# Patient Record
Sex: FEMALE | Race: WHITE | NOT HISPANIC OR LATINO | URBAN - METROPOLITAN AREA
[De-identification: names, ages, dates, MRNs, and addresses within clinical notes are randomized per-mention and may not be internally consistent; named-entity substitution may affect disease eponyms.]

---

## 2018-03-18 ENCOUNTER — EMERGENCY (EMERGENCY)
Facility: HOSPITAL | Age: 68
LOS: 0 days | Discharge: HOME | End: 2018-03-19
Attending: EMERGENCY MEDICINE

## 2018-03-18 VITALS
HEART RATE: 77 BPM | RESPIRATION RATE: 18 BRPM | DIASTOLIC BLOOD PRESSURE: 89 MMHG | OXYGEN SATURATION: 98 % | TEMPERATURE: 97 F | SYSTOLIC BLOOD PRESSURE: 185 MMHG

## 2018-03-18 DIAGNOSIS — Z98.890 OTHER SPECIFIED POSTPROCEDURAL STATES: Chronic | ICD-10-CM

## 2018-03-18 DIAGNOSIS — R07.89 OTHER CHEST PAIN: ICD-10-CM

## 2018-03-18 DIAGNOSIS — Z79.899 OTHER LONG TERM (CURRENT) DRUG THERAPY: ICD-10-CM

## 2018-03-18 DIAGNOSIS — E78.00 PURE HYPERCHOLESTEROLEMIA, UNSPECIFIED: ICD-10-CM

## 2018-03-18 DIAGNOSIS — Z79.891 LONG TERM (CURRENT) USE OF OPIATE ANALGESIC: ICD-10-CM

## 2018-03-18 DIAGNOSIS — Z90.11 ACQUIRED ABSENCE OF RIGHT BREAST AND NIPPLE: ICD-10-CM

## 2018-03-18 DIAGNOSIS — Z87.891 PERSONAL HISTORY OF NICOTINE DEPENDENCE: ICD-10-CM

## 2018-03-18 LAB
ALBUMIN SERPL ELPH-MCNC: 4.3 G/DL — SIGNIFICANT CHANGE UP (ref 3.5–5.2)
ALP SERPL-CCNC: 80 U/L — SIGNIFICANT CHANGE UP (ref 30–115)
ALT FLD-CCNC: 16 U/L — SIGNIFICANT CHANGE UP (ref 0–41)
AST SERPL-CCNC: 19 U/L — SIGNIFICANT CHANGE UP (ref 0–41)
BASOPHILS # BLD AUTO: 0.04 K/UL — SIGNIFICANT CHANGE UP (ref 0–0.2)
BASOPHILS NFR BLD AUTO: 0.5 % — SIGNIFICANT CHANGE UP (ref 0–1)
BILIRUB SERPL-MCNC: 0.2 MG/DL — SIGNIFICANT CHANGE UP (ref 0.2–1.2)
BUN SERPL-MCNC: 13 MG/DL — SIGNIFICANT CHANGE UP (ref 10–20)
CALCIUM SERPL-MCNC: 8.9 MG/DL — SIGNIFICANT CHANGE UP (ref 8.5–10.1)
CHLORIDE SERPL-SCNC: 100 MMOL/L — SIGNIFICANT CHANGE UP (ref 98–110)
CK MB BLD-MCNC: 2 % — SIGNIFICANT CHANGE UP (ref 0–4)
CK MB CFR SERPL CALC: 1.1 NG/ML — SIGNIFICANT CHANGE UP (ref 0.6–6.3)
CK SERPL-CCNC: 65 U/L — SIGNIFICANT CHANGE UP (ref 0–225)
CO2 SERPL-SCNC: 29 MMOL/L — SIGNIFICANT CHANGE UP (ref 17–32)
CREAT SERPL-MCNC: 0.8 MG/DL — SIGNIFICANT CHANGE UP (ref 0.7–1.5)
EOSINOPHIL # BLD AUTO: 0.09 K/UL — SIGNIFICANT CHANGE UP (ref 0–0.7)
EOSINOPHIL NFR BLD AUTO: 1.1 % — SIGNIFICANT CHANGE UP (ref 0–8)
GLUCOSE SERPL-MCNC: 91 MG/DL — SIGNIFICANT CHANGE UP (ref 70–110)
HCT VFR BLD CALC: 41.1 % — SIGNIFICANT CHANGE UP (ref 37–47)
HGB BLD-MCNC: 13.7 G/DL — SIGNIFICANT CHANGE UP (ref 12–16)
IMM GRANULOCYTES NFR BLD AUTO: 0.3 % — SIGNIFICANT CHANGE UP (ref 0.1–0.3)
LYMPHOCYTES # BLD AUTO: 3.48 K/UL — HIGH (ref 1.2–3.4)
LYMPHOCYTES # BLD AUTO: 44.2 % — SIGNIFICANT CHANGE UP (ref 20.5–51.1)
MCHC RBC-ENTMCNC: 29.8 PG — SIGNIFICANT CHANGE UP (ref 27–31)
MCHC RBC-ENTMCNC: 33.3 G/DL — SIGNIFICANT CHANGE UP (ref 32–37)
MCV RBC AUTO: 89.5 FL — SIGNIFICANT CHANGE UP (ref 81–99)
MONOCYTES # BLD AUTO: 0.55 K/UL — SIGNIFICANT CHANGE UP (ref 0.1–0.6)
MONOCYTES NFR BLD AUTO: 7 % — SIGNIFICANT CHANGE UP (ref 1.7–9.3)
NEUTROPHILS # BLD AUTO: 3.7 K/UL — SIGNIFICANT CHANGE UP (ref 1.4–6.5)
NEUTROPHILS NFR BLD AUTO: 46.9 % — SIGNIFICANT CHANGE UP (ref 42.2–75.2)
NRBC # BLD: 0 /100 WBCS — SIGNIFICANT CHANGE UP (ref 0–0)
PLATELET # BLD AUTO: 207 K/UL — SIGNIFICANT CHANGE UP (ref 130–400)
POTASSIUM SERPL-MCNC: 4.3 MMOL/L — SIGNIFICANT CHANGE UP (ref 3.5–5)
POTASSIUM SERPL-SCNC: 4.3 MMOL/L — SIGNIFICANT CHANGE UP (ref 3.5–5)
PROT SERPL-MCNC: 6.5 G/DL — SIGNIFICANT CHANGE UP (ref 6–8)
RBC # BLD: 4.59 M/UL — SIGNIFICANT CHANGE UP (ref 4.2–5.4)
RBC # FLD: 12.4 % — SIGNIFICANT CHANGE UP (ref 11.5–14.5)
SODIUM SERPL-SCNC: 140 MMOL/L — SIGNIFICANT CHANGE UP (ref 135–146)
TROPONIN T SERPL-MCNC: <0.01 NG/ML — SIGNIFICANT CHANGE UP
WBC # BLD: 7.88 K/UL — SIGNIFICANT CHANGE UP (ref 4.8–10.8)
WBC # FLD AUTO: 7.88 K/UL — SIGNIFICANT CHANGE UP (ref 4.8–10.8)

## 2018-03-18 RX ORDER — SODIUM CHLORIDE 9 MG/ML
3 INJECTION INTRAMUSCULAR; INTRAVENOUS; SUBCUTANEOUS ONCE
Qty: 0 | Refills: 0 | Status: COMPLETED | OUTPATIENT
Start: 2018-03-18 | End: 2018-03-18

## 2018-03-18 RX ADMIN — SODIUM CHLORIDE 3 MILLILITER(S): 9 INJECTION INTRAMUSCULAR; INTRAVENOUS; SUBCUTANEOUS at 18:35

## 2018-03-18 NOTE — ED PROVIDER NOTE - ATTENDING CONTRIBUTION TO CARE
I personally evaluated the patient. I reviewed the Resident’s or Physician Assistant’s note (as assigned above), and agree with the findings and plan except as documented in my note.     67 female here for eval of chest pressure. Recently moved from percocet to methadone by her PMD, read that methadone can cause chest pressure. Prior cardiac workup includes stress test in 2012, nothing since. Pressure is intermittent, unprovoked, not related to anything. No anginal equivalents.     PE: female in no distress. HEENT: non icteric. conjunctiva pink. CHEST: CTA bilateral. normal work of breathing. CV: pulses intact S1S2 ABD: soft, non rigid, no guarding. SKIN: normal    Impression: ACS    Plan: IV labs imaging aspirin supportive care and reevaluation

## 2018-03-18 NOTE — ED PROVIDER NOTE - NS ED ROS FT
Review of Systems    Constitutional: (-) fever/ chills (-) weight loss  Eyes/ENT: (-) blurry vision, (-) epistaxis (-) sore throat (-) ear pain  Cardiovascular: (+) chest pain, (-) syncope (-) palpitations  Respiratory: (-) cough, (-) shortness of breath  Gastrointestinal: (-) vomiting, (-) diarrhea (-) abdominal pain  Musculoskeletal: (-) neck pain, (-) back pain, (-) joint pain (-) pedal edema   Integumentary: (-) rash, (-) swelling  Neurological: (-) headache, (-) altered mental status  Psychiatric: (-) hallucinations or depression   Allergic/Immunologic: (-) pruritus

## 2018-03-18 NOTE — ED PROVIDER NOTE - MEDICAL DECISION MAKING DETAILS
67 female here for chest pressure. Had workup in ED with labs monitoring and reevaluation. Remained asymptomatic during stay. Will continue outpatient management.

## 2018-03-18 NOTE — ED PROVIDER NOTE - PMH
Anxiety    Heart murmur    High blood cholesterol    Mitral valve regurgitation Anxiety    Heart murmur    High blood cholesterol    Mitral valve regurgitation    Parotid gland adenocarcinoma    Sarcoma of bone of foot, right

## 2018-03-18 NOTE — ED PROVIDER NOTE - OBJECTIVE STATEMENT
Pt with a h/o RSD s/p soft tissue CA and anxiety comes in c/o CP. Pt states that she has been feeling intermitting pressure in her chest since Wednesday. Pt has been on Oxycodone 60mg for the last 5 years and was just recently switched over to 5mg of Methadone and last week started on 10mg of Methadone. Pt states that her symptoms with the RSD feels better but is concerned for the chest pressure. Pt denies any SOB, nausea, vomiting, back pain, leg pain.

## 2018-03-19 VITALS
RESPIRATION RATE: 18 BRPM | OXYGEN SATURATION: 98 % | HEART RATE: 71 BPM | DIASTOLIC BLOOD PRESSURE: 70 MMHG | SYSTOLIC BLOOD PRESSURE: 143 MMHG | TEMPERATURE: 97 F

## 2018-03-19 LAB
CK MB CFR SERPL CALC: 1 NG/ML — SIGNIFICANT CHANGE UP (ref 0.6–6.3)
TROPONIN T SERPL-MCNC: <0.01 NG/ML — SIGNIFICANT CHANGE UP

## 2018-12-26 ENCOUNTER — EMERGENCY (EMERGENCY)
Facility: HOSPITAL | Age: 68
LOS: 0 days | Discharge: HOME | End: 2018-12-26
Attending: EMERGENCY MEDICINE | Admitting: EMERGENCY MEDICINE

## 2018-12-26 VITALS
RESPIRATION RATE: 18 BRPM | DIASTOLIC BLOOD PRESSURE: 74 MMHG | SYSTOLIC BLOOD PRESSURE: 131 MMHG | HEART RATE: 73 BPM | OXYGEN SATURATION: 98 % | TEMPERATURE: 98 F

## 2018-12-26 VITALS
HEART RATE: 81 BPM | RESPIRATION RATE: 18 BRPM | HEIGHT: 62 IN | DIASTOLIC BLOOD PRESSURE: 98 MMHG | SYSTOLIC BLOOD PRESSURE: 145 MMHG | WEIGHT: 154.1 LBS | TEMPERATURE: 97 F
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
## 2018-12-26 DIAGNOSIS — Z91.041 RADIOGRAPHIC DYE ALLERGY STATUS: ICD-10-CM

## 2018-12-26 DIAGNOSIS — Z79.899 OTHER LONG TERM (CURRENT) DRUG THERAPY: ICD-10-CM

## 2018-12-26 DIAGNOSIS — Z98.890 OTHER SPECIFIED POSTPROCEDURAL STATES: Chronic | ICD-10-CM

## 2018-12-26 DIAGNOSIS — Z90.11 ACQUIRED ABSENCE OF RIGHT BREAST AND NIPPLE: ICD-10-CM

## 2018-12-26 DIAGNOSIS — R51 HEADACHE: ICD-10-CM
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
## 2018-12-26 DIAGNOSIS — S09.90XA UNSPECIFIED INJURY OF HEAD, INITIAL ENCOUNTER: ICD-10-CM

## 2018-12-26 DIAGNOSIS — E78.00 PURE HYPERCHOLESTEROLEMIA, UNSPECIFIED: ICD-10-CM
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
## 2018-12-26 DIAGNOSIS — Z79.891 LONG TERM (CURRENT) USE OF OPIATE ANALGESIC: ICD-10-CM
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
## 2018-12-26 PROBLEM — I34.0 NONRHEUMATIC MITRAL (VALVE) INSUFFICIENCY: Chronic | Status: ACTIVE | Noted: 2018-03-18

## 2018-12-26 PROBLEM — C07 MALIGNANT NEOPLASM OF PAROTID GLAND: Chronic | Status: ACTIVE | Noted: 2018-03-18

## 2018-12-26 PROBLEM — F41.9 ANXIETY DISORDER, UNSPECIFIED: Chronic | Status: ACTIVE | Noted: 2018-03-18

## 2018-12-26 PROBLEM — C40.31: Chronic | Status: ACTIVE | Noted: 2018-03-18

## 2018-12-26 NOTE — ED ADULT NURSE NOTE - PMH
Anxiety    Heart murmur    High blood cholesterol    Mitral valve regurgitation    Parotid gland adenocarcinoma    Sarcoma of bone of foot, right

## 2018-12-26 NOTE — ED PROVIDER NOTE - ATTENDING CONTRIBUTION TO CARE
68 year old female, sent in by psychiatrist to rule out a SDH after she fell out of her bed 2 days ago and hit her head on the floor, no loc, no n/v/d, no cp/sob, no change in vision    CONSTITUTIONAL: Well-developed; well-nourished; in no acute distress. Sitting up and providing appropriate history and physical examination  TRAUMA: Primary and Secondary surveys intact, GCS 15, no midline CTLS spine tenderness, Pelvis stable, + moving all extremities  SKIN: skin exam is warm and dry, no acute rash.  HEAD: Normocephalic; + left temporal region tenderness  EYES: PERRL, 3 mm bilateral, no nystagmus, EOM intact; conjunctiva and sclera clear.  ENT: No nasal discharge; airway clear.  NECK: Supple; non tender. + full passive ROM in all directions. No JVD  CARD: S1, S2 normal; no murmurs, gallops, or rubs. Regular rate and rhythm. + Symmetric Strong Pulses  RESP: No wheezes, rales or rhonchi. Good air movement bilaterally  ABD: soft; non-distended; non-tender. No Rebound, No Guarding, No signs of peritonitis, No CVA tenderness. No pulsatile abdominal mass. + Strong and Symmetric Pulses  EXT: Normal ROM. No clubbing, cyanosis or edema. Dp and Pt Pulses intact. Cap refill less than 3 seconds  NEURO: CN 2-12 intact, normal finger to nose, normal romberg, stable gait, no sensory or motor deficits, Alert, oriented, grossly unremarkable. No Focal deficits. GCS 15. NIH 0      I have fully discussed the medical management and delivery of care with the patient. I have discussed any available labs, imaging and treatment options with the patient. Patient confirms understanding and has been given detailed return precautions. Patient instructed to return to the ED should symptoms persist or worsen. Patient has demonstrated capacity and has verbalized understanding. Patient is well appearing upon discharge.

## 2018-12-26 NOTE — ED PROVIDER NOTE - NS ED ROS FT
Review of Systems:  	•	CONSTITUTIONAL - no fever, no diaphoresis, no chills  	•	SKIN - no rash  	•	HEMATOLOGIC - no bleeding, no bruising  	•	EYES - no eye pain, no blurry vision  	•	ENT - no change in hearing, no sore throat, no ear pain or tinnitus  	•	RESPIRATORY - no shortness of breath, no cough  	•	CARDIAC - no chest pain, no palpitations  	•  	•	PSYCH - no anxiety, non suicidal, non homicidal, no hallucination, no depression

## 2018-12-26 NOTE — ED PROVIDER NOTE - PHYSICAL EXAMINATION
--EXAM--  VITAL SIGNS: I have reviewed vs documented at present.  CONSTITUTIONAL: Well-developed; well-nourished; in no acute distress.   SKIN: Warm and dry, no acute rash.   HEAD: Normocephalic; atraumatic.    CARD: S1, S2, Regular rate and rhythm.   RESP: No wheezes, rales or rhonchi.    EXT: Normal ROM.   NEURO: Alert, oriented, grossly unremarkable. Strength 5/5 in all extremities. Sensation intact throughout.  PSYCH: Cooperative, appropriate.

## 2018-12-26 NOTE — ED PROVIDER NOTE - NS ED ATTENDING STATEMENT MOD

## 2018-12-26 NOTE — ED ADULT NURSE NOTE - CHPI ED NUR SYMPTOMS NEG
no vomiting/no weakness/no fever/no tingling/no loss of consciousness/no deformity/no confusion/no abrasion/no bleeding/no numbness

## 2020-04-16 ENCOUNTER — TRANSCRIPTION ENCOUNTER (OUTPATIENT)
Age: 70
End: 2020-04-16

## 2020-05-29 ENCOUNTER — EMERGENCY (EMERGENCY)
Facility: HOSPITAL | Age: 70
LOS: 0 days | Discharge: HOME | End: 2020-05-29
Attending: EMERGENCY MEDICINE | Admitting: EMERGENCY MEDICINE
Payer: MEDICARE

## 2020-05-29 VITALS
RESPIRATION RATE: 16 BRPM | SYSTOLIC BLOOD PRESSURE: 120 MMHG | OXYGEN SATURATION: 100 % | DIASTOLIC BLOOD PRESSURE: 69 MMHG | HEART RATE: 77 BPM

## 2020-05-29 VITALS
RESPIRATION RATE: 19 BRPM | OXYGEN SATURATION: 97 % | SYSTOLIC BLOOD PRESSURE: 142 MMHG | HEART RATE: 100 BPM | TEMPERATURE: 98 F | DIASTOLIC BLOOD PRESSURE: 101 MMHG

## 2020-05-29 DIAGNOSIS — B34.9 VIRAL INFECTION, UNSPECIFIED: ICD-10-CM

## 2020-05-29 DIAGNOSIS — R07.9 CHEST PAIN, UNSPECIFIED: ICD-10-CM

## 2020-05-29 DIAGNOSIS — R50.9 FEVER, UNSPECIFIED: ICD-10-CM

## 2020-05-29 DIAGNOSIS — Z98.890 OTHER SPECIFIED POSTPROCEDURAL STATES: Chronic | ICD-10-CM

## 2020-05-29 DIAGNOSIS — Z88.5 ALLERGY STATUS TO NARCOTIC AGENT: ICD-10-CM

## 2020-05-29 DIAGNOSIS — E78.00 PURE HYPERCHOLESTEROLEMIA, UNSPECIFIED: ICD-10-CM

## 2020-05-29 LAB
ALBUMIN SERPL ELPH-MCNC: 3.9 G/DL — SIGNIFICANT CHANGE UP (ref 3.5–5.2)
ALP SERPL-CCNC: 59 U/L — SIGNIFICANT CHANGE UP (ref 30–115)
ALT FLD-CCNC: <5 U/L — SIGNIFICANT CHANGE UP (ref 0–41)
ANION GAP SERPL CALC-SCNC: 11 MMOL/L — SIGNIFICANT CHANGE UP (ref 7–14)
APPEARANCE UR: CLEAR — SIGNIFICANT CHANGE UP
AST SERPL-CCNC: 77 U/L — HIGH (ref 0–41)
BILIRUB SERPL-MCNC: <0.2 MG/DL — SIGNIFICANT CHANGE UP (ref 0.2–1.2)
BILIRUB UR-MCNC: NEGATIVE — SIGNIFICANT CHANGE UP
BUN SERPL-MCNC: 12 MG/DL — SIGNIFICANT CHANGE UP (ref 10–20)
CALCIUM SERPL-MCNC: 8.5 MG/DL — SIGNIFICANT CHANGE UP (ref 8.5–10.1)
CHLORIDE SERPL-SCNC: 104 MMOL/L — SIGNIFICANT CHANGE UP (ref 98–110)
CO2 SERPL-SCNC: 20 MMOL/L — SIGNIFICANT CHANGE UP (ref 17–32)
COLOR SPEC: YELLOW — SIGNIFICANT CHANGE UP
CREAT SERPL-MCNC: 1 MG/DL — SIGNIFICANT CHANGE UP (ref 0.7–1.5)
DIFF PNL FLD: NEGATIVE — SIGNIFICANT CHANGE UP
GLUCOSE SERPL-MCNC: 103 MG/DL — HIGH (ref 70–99)
GLUCOSE UR QL: NEGATIVE — SIGNIFICANT CHANGE UP
HCT VFR BLD CALC: 42.5 % — SIGNIFICANT CHANGE UP (ref 37–47)
HGB BLD-MCNC: 14.3 G/DL — SIGNIFICANT CHANGE UP (ref 12–16)
KETONES UR-MCNC: NEGATIVE — SIGNIFICANT CHANGE UP
LEUKOCYTE ESTERASE UR-ACNC: NEGATIVE — SIGNIFICANT CHANGE UP
MAGNESIUM SERPL-MCNC: 2.2 MG/DL — SIGNIFICANT CHANGE UP (ref 1.8–2.4)
MCHC RBC-ENTMCNC: 30.8 PG — SIGNIFICANT CHANGE UP (ref 27–31)
MCHC RBC-ENTMCNC: 33.6 G/DL — SIGNIFICANT CHANGE UP (ref 32–37)
MCV RBC AUTO: 91.4 FL — SIGNIFICANT CHANGE UP (ref 81–99)
NITRITE UR-MCNC: NEGATIVE — SIGNIFICANT CHANGE UP
NRBC # BLD: 0 /100 WBCS — SIGNIFICANT CHANGE UP (ref 0–0)
NT-PROBNP SERPL-SCNC: 74 PG/ML — SIGNIFICANT CHANGE UP (ref 0–300)
PH UR: 8 — SIGNIFICANT CHANGE UP (ref 5–8)
PLATELET # BLD AUTO: 151 K/UL — SIGNIFICANT CHANGE UP (ref 130–400)
POTASSIUM SERPL-MCNC: SIGNIFICANT CHANGE UP MMOL/L (ref 3.5–5)
POTASSIUM SERPL-SCNC: SIGNIFICANT CHANGE UP MMOL/L (ref 3.5–5)
PROT SERPL-MCNC: 6.9 G/DL — SIGNIFICANT CHANGE UP (ref 6–8)
PROT UR-MCNC: SIGNIFICANT CHANGE UP
RBC # BLD: 4.65 M/UL — SIGNIFICANT CHANGE UP (ref 4.2–5.4)
RBC # FLD: 12.8 % — SIGNIFICANT CHANGE UP (ref 11.5–14.5)
SODIUM SERPL-SCNC: 135 MMOL/L — SIGNIFICANT CHANGE UP (ref 135–146)
SP GR SPEC: 1.02 — SIGNIFICANT CHANGE UP (ref 1.01–1.02)
TROPONIN T SERPL-MCNC: <0.01 NG/ML — SIGNIFICANT CHANGE UP
TROPONIN T SERPL-MCNC: <0.01 NG/ML — SIGNIFICANT CHANGE UP
UROBILINOGEN FLD QL: SIGNIFICANT CHANGE UP
WBC # BLD: 5.52 K/UL — SIGNIFICANT CHANGE UP (ref 4.8–10.8)
WBC # FLD AUTO: 5.52 K/UL — SIGNIFICANT CHANGE UP (ref 4.8–10.8)

## 2020-05-29 PROCEDURE — 93010 ELECTROCARDIOGRAM REPORT: CPT

## 2020-05-29 PROCEDURE — 71045 X-RAY EXAM CHEST 1 VIEW: CPT | Mod: 26

## 2020-05-29 PROCEDURE — 99285 EMERGENCY DEPT VISIT HI MDM: CPT | Mod: GC

## 2020-05-29 NOTE — ED ADULT NURSE NOTE - NSIMPLEMENTINTERV_GEN_ALL_ED
Implemented All Universal Safety Interventions:  [place] to call system. Call bell, personal items and telephone within reach. Instruct patient to call for assistance. Room bathroom lighting operational. Non-slip footwear when patient is off stretcher. Physically safe environment: no spills, clutter or unnecessary equipment. Stretcher in lowest position, wheels locked, appropriate side rails in place.

## 2020-05-29 NOTE — ED PROVIDER NOTE - CARE PLAN
Assessment and plan of treatment:	Plan: EKG, CXR, labs, reassess. Principal Discharge DX:	Viral syndrome  Assessment and plan of treatment:	Plan: EKG, CXR, labs, reassess.  Secondary Diagnosis:	Chest pain

## 2020-05-29 NOTE — ED ADULT TRIAGE NOTE - PATIENT ON (OXYGEN DELIVERY METHOD)
room air Epidermal Autograft Text: The defect edges were debeveled with a #15 scalpel blade.  Given the location of the defect, shape of the defect and the proximity to free margins an epidermal autograft was deemed most appropriate.  Using a sterile surgical marker, the primary defect shape was transferred to the donor site. The epidermal graft was then harvested.  The skin graft was then placed in the primary defect and oriented appropriately.

## 2020-05-29 NOTE — ED PROVIDER NOTE - PHYSICAL EXAMINATION
CONSTITUTIONAL: Well-developed; well-nourished; in no acute distress.   SKIN: warm, dry  HEAD: Normocephalic; atraumatic.  EYES: PERRL, EOMI, normal sclera and conjunctiva   ENT: No nasal discharge; airway clear.  NECK: Supple; non tender.  CARD: S1, S2 normal; no murmurs, gallops, or rubs. Regular rate and rhythm.   RESP: No wheezes, rales or rhonchi.  ABD: soft ntnd  EXT: Normal ROM.  No clubbing, cyanosis or edema.   LYMPH: No acute cervical adenopathy.  NEURO: Alert, oriented, grossly unremarkable. 5/5 strength in all 4 extremities. no cerebellar signs  PSYCH: Cooperative, appropriate.

## 2020-05-29 NOTE — ED PROVIDER NOTE - ATTENDING CONTRIBUTION TO CARE
70 y/o f w pmhx of RSD s/p soft tissue CA and anxiety 68 y/o f w pmhx of miscropscopic hematuria in urine, RSD s/p soft tissue CA, anxiety, HLD, R foor sarcoma, parotid gland adenocarcinoma, presents for multiple complaints, ~ 3 weeks of  malaise, fever of 100.5 at times, sore throat, epigastric achiness, non-radiating, intermittent, dry cough, and mid-sternal chest pain, dull in nature, non-radiating, intermittent. pt has family who was (+) COVID, got tested three times, last time was ~ 4 days ago on Tuesday and is negative. pt is anxious she still has COVID and is requesting antibody testing. pt has not followed up with any of her physicians.   No  nausea, vomiting, drooling/secretions, trismus, dysphagia/ odynophagia, muffled/hot potato voice. sob, pleuritic chest pain, palpitations, diaphoresis, resp distress, weakness/unusual behavior, rhinorrhea, congestion, diarrhea, constipation, urinary symptoms, HA/LH/dizziness, trauma, recent travel, or rash.     On exam: Female pt sitting on stretcher,  speaking full sentences, in no acute distress.  no sniffing position, no hot potato/ muffled voice. No rash. PERRL, no discharge or pallor. No rhinorrhea. MMM, No erythema, exudates or petechiae. Uvula midline. No drooling/secretions, no strawberry tongue,  No PTA. No trismus. No malocclusion. No TMJ pain. No elevation of floor of mouth/ no pain to palpation of floor of mouth. No oropharyngeal edema. No anterior neck pain. Neck supple, no meningeal signs, no torticollis. RRR. Radial pulses 2/4 /bl. Cap refill < 2 seconds. No congestion. Breaths sounds present b/l. CTABL. No wheezes or crackles. No accessory muscle use/retractions. No stridor. BS present throughout all 4 quadrants; soft; non-distended; non-tender; no rebound tenderness/guarding, no cvat, no hepatosplenomegaly. No palpable masses. Moving all ext. No acute LAD. Awake and alert, interactive. No focal deficits. 68 y/o f w pmhx of miscropscopic hematuria in urine, RSD s/p soft tissue CA, anxiety, HLD, R foot sarcoma, parotid gland adenocarcinoma, presents for multiple complaints, ~ 3 weeks of  malaise, fever of 100.5 at times, sore throat, epigastric achiness, non-radiating, intermittent, dry cough, and mid-sternal chest pain, dull in nature, non-radiating, intermittent. pt has family who was (+) COVID, got tested three times, last time was ~ 4 days ago on Tuesday and is negative. pt is anxious she still has COVID and is requesting antibody testing. pt has not followed up with any of her physicians.   No  nausea, vomiting, drooling/secretions, trismus, dysphagia/ odynophagia, muffled/hot potato voice. sob, pleuritic chest pain, palpitations, diaphoresis, resp distress, weakness/unusual behavior, rhinorrhea, congestion, diarrhea, constipation, urinary symptoms, HA/LH/dizziness, trauma, recent travel, or rash.     On exam: Female pt sitting on stretcher,  speaking full sentences, in no acute distress.  no sniffing position, no hot potato/ muffled voice. No rash. PERRL, no discharge or pallor. No rhinorrhea. MMM, No erythema, exudates or petechiae. Uvula midline. No drooling/secretions, no strawberry tongue,  No PTA. No trismus. No malocclusion. No TMJ pain. No elevation of floor of mouth/ no pain to palpation of floor of mouth. No oropharyngeal edema. No anterior neck pain. Neck supple, no meningeal signs, no torticollis. RRR. Radial pulses 2/4 /bl. Cap refill < 2 seconds. No congestion. Breaths sounds present b/l. CTABL. No wheezes or crackles. No accessory muscle use/retractions. No stridor. BS present throughout all 4 quadrants; soft; non-distended; non-tender; no rebound tenderness/guarding, no cvat, no hepatosplenomegaly. No palpable masses. Moving all ext. No acute LAD. Awake and alert, interactive. No focal deficits.

## 2020-05-29 NOTE — ED PROVIDER NOTE - PATIENT PORTAL LINK FT
You can access the FollowMyHealth Patient Portal offered by [facility] by registering at the following website: [email or web address]. By joining [email or web address]’s FollowMyHealth portal, you will also be able to view your health information using other applications (apps) compatible with our system.

## 2020-05-29 NOTE — ED PROVIDER NOTE - CARE PROVIDER_API CALL
Samantha Rosales  INTERNAL MEDICINE  [address] [place] [address]  Phone: [phone]  Fax: [phone]  Follow Up Time:

## 2020-05-29 NOTE — ED PROVIDER NOTE - CLINICAL SUMMARY MEDICAL DECISION MAKING FREE TEXT BOX
Pt with complaints of cough and laryngitis, with negative COVID test recently on Tuesday. Also here for some CP. Negative troponin 2x. CXR reassuring. Pt is d/c home to f/u with PMD and cardiology.

## 2020-05-29 NOTE — ED PROVIDER NOTE - OBJECTIVE STATEMENT
pt is a 69 yof w/ pmhx of parotid gland adenocarcinoma, anxiety, HLD, r foot sarcoma here for multiple medical complaints. Pt has had intermittent fevers, cough, nonradiating chest pain, fatigue, abdominal pain, sore throat for a month. pt has contacts with covid that live above her. pt got tested 3 times, repeatedly negative. denies drooling, pleuritic chest pain, diarrhea, muffled voice

## 2020-05-30 LAB
CULTURE RESULTS: SIGNIFICANT CHANGE UP
SPECIMEN SOURCE: SIGNIFICANT CHANGE UP

## 2020-06-25 ENCOUNTER — INPATIENT (INPATIENT)
Facility: HOSPITAL | Age: 70
LOS: 0 days | Discharge: HOME | End: 2020-06-26
Attending: INTERNAL MEDICINE | Admitting: INTERNAL MEDICINE
Payer: MEDICARE

## 2020-06-25 VITALS
OXYGEN SATURATION: 95 % | HEART RATE: 86 BPM | RESPIRATION RATE: 20 BRPM | DIASTOLIC BLOOD PRESSURE: 76 MMHG | SYSTOLIC BLOOD PRESSURE: 157 MMHG | TEMPERATURE: 99 F

## 2020-06-25 DIAGNOSIS — Z85.818 PERSONAL HISTORY OF MALIGNANT NEOPLASM OF OTHER SITES OF LIP, ORAL CAVITY, AND PHARYNX: ICD-10-CM

## 2020-06-25 DIAGNOSIS — Z85.3 PERSONAL HISTORY OF MALIGNANT NEOPLASM OF BREAST: ICD-10-CM

## 2020-06-25 DIAGNOSIS — U07.1 COVID-19: ICD-10-CM

## 2020-06-25 DIAGNOSIS — R00.2 PALPITATIONS: ICD-10-CM

## 2020-06-25 DIAGNOSIS — Z98.890 OTHER SPECIFIED POSTPROCEDURAL STATES: Chronic | ICD-10-CM

## 2020-06-25 DIAGNOSIS — E78.5 HYPERLIPIDEMIA, UNSPECIFIED: ICD-10-CM

## 2020-06-25 DIAGNOSIS — G90.521 COMPLEX REGIONAL PAIN SYNDROME I OF RIGHT LOWER LIMB: ICD-10-CM

## 2020-06-25 DIAGNOSIS — F41.9 ANXIETY DISORDER, UNSPECIFIED: ICD-10-CM

## 2020-06-25 DIAGNOSIS — Z85.830 PERSONAL HISTORY OF MALIGNANT NEOPLASM OF BONE: ICD-10-CM

## 2020-06-25 LAB
ALBUMIN SERPL ELPH-MCNC: 4 G/DL — SIGNIFICANT CHANGE UP (ref 3.5–5.2)
ALP SERPL-CCNC: 82 U/L — SIGNIFICANT CHANGE UP (ref 30–115)
ALT FLD-CCNC: 40 U/L — SIGNIFICANT CHANGE UP (ref 0–41)
ANION GAP SERPL CALC-SCNC: 13 MMOL/L — SIGNIFICANT CHANGE UP (ref 7–14)
AST SERPL-CCNC: 37 U/L — SIGNIFICANT CHANGE UP (ref 0–41)
BILIRUB SERPL-MCNC: 0.2 MG/DL — SIGNIFICANT CHANGE UP (ref 0.2–1.2)
BUN SERPL-MCNC: 12 MG/DL — SIGNIFICANT CHANGE UP (ref 10–20)
CALCIUM SERPL-MCNC: 9.2 MG/DL — SIGNIFICANT CHANGE UP (ref 8.5–10.1)
CHLORIDE SERPL-SCNC: 99 MMOL/L — SIGNIFICANT CHANGE UP (ref 98–110)
CO2 SERPL-SCNC: 26 MMOL/L — SIGNIFICANT CHANGE UP (ref 17–32)
CREAT SERPL-MCNC: 1 MG/DL — SIGNIFICANT CHANGE UP (ref 0.7–1.5)
D DIMER BLD IA.RAPID-MCNC: 280 NG/ML DDU — HIGH (ref 0–230)
GLUCOSE SERPL-MCNC: 98 MG/DL — SIGNIFICANT CHANGE UP (ref 70–99)
HCT VFR BLD CALC: 40.7 % — SIGNIFICANT CHANGE UP (ref 37–47)
HGB BLD-MCNC: 13.7 G/DL — SIGNIFICANT CHANGE UP (ref 12–16)
MCHC RBC-ENTMCNC: 30.4 PG — SIGNIFICANT CHANGE UP (ref 27–31)
MCHC RBC-ENTMCNC: 33.7 G/DL — SIGNIFICANT CHANGE UP (ref 32–37)
MCV RBC AUTO: 90.2 FL — SIGNIFICANT CHANGE UP (ref 81–99)
NRBC # BLD: 0 /100 WBCS — SIGNIFICANT CHANGE UP (ref 0–0)
PLATELET # BLD AUTO: 181 K/UL — SIGNIFICANT CHANGE UP (ref 130–400)
POTASSIUM SERPL-MCNC: 4.5 MMOL/L — SIGNIFICANT CHANGE UP (ref 3.5–5)
POTASSIUM SERPL-SCNC: 4.5 MMOL/L — SIGNIFICANT CHANGE UP (ref 3.5–5)
PROT SERPL-MCNC: 6.7 G/DL — SIGNIFICANT CHANGE UP (ref 6–8)
RBC # BLD: 4.51 M/UL — SIGNIFICANT CHANGE UP (ref 4.2–5.4)
RBC # FLD: 12.7 % — SIGNIFICANT CHANGE UP (ref 11.5–14.5)
SODIUM SERPL-SCNC: 138 MMOL/L — SIGNIFICANT CHANGE UP (ref 135–146)
TROPONIN T SERPL-MCNC: <0.01 NG/ML — SIGNIFICANT CHANGE UP
WBC # BLD: 7.85 K/UL — SIGNIFICANT CHANGE UP (ref 4.8–10.8)
WBC # FLD AUTO: 7.85 K/UL — SIGNIFICANT CHANGE UP (ref 4.8–10.8)

## 2020-06-25 PROCEDURE — 99285 EMERGENCY DEPT VISIT HI MDM: CPT | Mod: CS,GC

## 2020-06-25 NOTE — ED ADULT TRIAGE NOTE - CHIEF COMPLAINT QUOTE
Pt presents to ED with c/o SOB and palpitations today. Pt states she is being monitors by doctors for COVID diagnosis. Pt states she tested + on June 1st.

## 2020-06-25 NOTE — ED PROVIDER NOTE - ATTENDING CONTRIBUTION TO CARE
69 yo f htn, hld, parotid adenocarcinoma, R foot sarcoma  pt presents w/ palpitations w/ associated sob. sx at rest. no calf pain/swelling.  [place] being [place] [facility] [place] w/ positive test last month. no chest pain. no GARCIA.

## 2020-06-25 NOTE — ED PROVIDER NOTE - NS ED ROS FT
Eyes:  No visual changes, eye pain or discharge.  ENMT:  No hearing changes, pain, discharge or infections. No neck pain or stiffness.  Cardiac:  No chest pain. +sob. no edema. No chest pain with exertion.  Respiratory:  No cough or respiratory distress. No hemoptysis. No history of asthma or RAD.  GI:  No nausea, vomiting, diarrhea or abdominal pain.  :  No dysuria, frequency or burning.  MS:  No myalgia, muscle weakness, joint pain or back pain.  Neuro:  No headache or weakness.  No LOC.  Skin:  No skin rash.   Endocrine: No history of thyroid disease or diabetes.

## 2020-06-25 NOTE — ED PROVIDER NOTE - OBJECTIVE STATEMENT
pt is a 70 yof w/ adenocarcinoma of parotid, sarcoma of RLE, HLD, HTN, anxiety here for sob w/ palpitations for the last week. pt states she was recently diagnosed with COVID at [place], and was followed for the last month by them. pt was told to come in for r/o "blood clot". denies fever, cp, abd pain, n/v/d,

## 2020-06-25 NOTE — ED PROVIDER NOTE - CLINICAL SUMMARY MEDICAL DECISION MAKING FREE TEXT BOX
pt presents w/ episodes of palpitations/sob. ed w/u negative apart from positive dimer. pt refusing cta chest given contrast allergy. will admit for pe r/o, monitoring

## 2020-06-26 ENCOUNTER — TRANSCRIPTION ENCOUNTER (OUTPATIENT)
Age: 70
End: 2020-06-26

## 2020-06-26 VITALS
SYSTOLIC BLOOD PRESSURE: 146 MMHG | RESPIRATION RATE: 18 BRPM | HEART RATE: 81 BPM | TEMPERATURE: 98 F | DIASTOLIC BLOOD PRESSURE: 88 MMHG | OXYGEN SATURATION: 97 %

## 2020-06-26 LAB
ALBUMIN SERPL ELPH-MCNC: 3.9 G/DL — SIGNIFICANT CHANGE UP (ref 3.5–5.2)
ALP SERPL-CCNC: 82 U/L — SIGNIFICANT CHANGE UP (ref 30–115)
ALT FLD-CCNC: 38 U/L — SIGNIFICANT CHANGE UP (ref 0–41)
ANION GAP SERPL CALC-SCNC: 13 MMOL/L — SIGNIFICANT CHANGE UP (ref 7–14)
AST SERPL-CCNC: 35 U/L — SIGNIFICANT CHANGE UP (ref 0–41)
BASOPHILS # BLD AUTO: 0.04 K/UL — SIGNIFICANT CHANGE UP (ref 0–0.2)
BASOPHILS NFR BLD AUTO: 0.4 % — SIGNIFICANT CHANGE UP (ref 0–1)
BILIRUB SERPL-MCNC: 0.2 MG/DL — SIGNIFICANT CHANGE UP (ref 0.2–1.2)
BUN SERPL-MCNC: 13 MG/DL — SIGNIFICANT CHANGE UP (ref 10–20)
CALCIUM SERPL-MCNC: 9.4 MG/DL — SIGNIFICANT CHANGE UP (ref 8.5–10.1)
CHLORIDE SERPL-SCNC: 101 MMOL/L — SIGNIFICANT CHANGE UP (ref 98–110)
CO2 SERPL-SCNC: 27 MMOL/L — SIGNIFICANT CHANGE UP (ref 17–32)
CREAT SERPL-MCNC: 0.9 MG/DL — SIGNIFICANT CHANGE UP (ref 0.7–1.5)
EOSINOPHIL # BLD AUTO: 0.1 K/UL — SIGNIFICANT CHANGE UP (ref 0–0.7)
EOSINOPHIL NFR BLD AUTO: 1 % — SIGNIFICANT CHANGE UP (ref 0–8)
GLUCOSE SERPL-MCNC: 113 MG/DL — HIGH (ref 70–99)
HCT VFR BLD CALC: 40.2 % — SIGNIFICANT CHANGE UP (ref 37–47)
HCV AB S/CO SERPL IA: 0.04 COI — SIGNIFICANT CHANGE UP
HCV AB SERPL-IMP: SIGNIFICANT CHANGE UP
HGB BLD-MCNC: 13.5 G/DL — SIGNIFICANT CHANGE UP (ref 12–16)
IMM GRANULOCYTES NFR BLD AUTO: 0.4 % — HIGH (ref 0.1–0.3)
LYMPHOCYTES # BLD AUTO: 3.85 K/UL — HIGH (ref 1.2–3.4)
LYMPHOCYTES # BLD AUTO: 39.6 % — SIGNIFICANT CHANGE UP (ref 20.5–51.1)
MAGNESIUM SERPL-MCNC: 2.2 MG/DL — SIGNIFICANT CHANGE UP (ref 1.8–2.4)
MCHC RBC-ENTMCNC: 30.5 PG — SIGNIFICANT CHANGE UP (ref 27–31)
MCHC RBC-ENTMCNC: 33.6 G/DL — SIGNIFICANT CHANGE UP (ref 32–37)
MCV RBC AUTO: 90.7 FL — SIGNIFICANT CHANGE UP (ref 81–99)
MONOCYTES # BLD AUTO: 0.82 K/UL — HIGH (ref 0.1–0.6)
MONOCYTES NFR BLD AUTO: 8.4 % — SIGNIFICANT CHANGE UP (ref 1.7–9.3)
NEUTROPHILS # BLD AUTO: 4.87 K/UL — SIGNIFICANT CHANGE UP (ref 1.4–6.5)
NEUTROPHILS NFR BLD AUTO: 50.2 % — SIGNIFICANT CHANGE UP (ref 42.2–75.2)
NRBC # BLD: 0 /100 WBCS — SIGNIFICANT CHANGE UP (ref 0–0)
PLATELET # BLD AUTO: 184 K/UL — SIGNIFICANT CHANGE UP (ref 130–400)
POTASSIUM SERPL-MCNC: 4.6 MMOL/L — SIGNIFICANT CHANGE UP (ref 3.5–5)
POTASSIUM SERPL-SCNC: 4.6 MMOL/L — SIGNIFICANT CHANGE UP (ref 3.5–5)
PROT SERPL-MCNC: 6.4 G/DL — SIGNIFICANT CHANGE UP (ref 6–8)
RBC # BLD: 4.43 M/UL — SIGNIFICANT CHANGE UP (ref 4.2–5.4)
RBC # FLD: 12.9 % — SIGNIFICANT CHANGE UP (ref 11.5–14.5)
SARS-COV-2 RNA SPEC QL NAA+PROBE: DETECTED
SODIUM SERPL-SCNC: 141 MMOL/L — SIGNIFICANT CHANGE UP (ref 135–146)
WBC # BLD: 9.72 K/UL — SIGNIFICANT CHANGE UP (ref 4.8–10.8)
WBC # FLD AUTO: 9.72 K/UL — SIGNIFICANT CHANGE UP (ref 4.8–10.8)

## 2020-06-26 PROCEDURE — 99223 1ST HOSP IP/OBS HIGH 75: CPT | Mod: AI

## 2020-06-26 PROCEDURE — 93010 ELECTROCARDIOGRAM REPORT: CPT

## 2020-06-26 PROCEDURE — 71045 X-RAY EXAM CHEST 1 VIEW: CPT | Mod: 26

## 2020-06-26 RX ORDER — OXYCODONE HYDROCHLORIDE 5 MG/1
0 TABLET ORAL
Qty: 0 | Refills: 0 | DISCHARGE

## 2020-06-26 RX ORDER — OXYCODONE HYDROCHLORIDE 5 MG/1
15 TABLET ORAL EVERY 6 HOURS
Refills: 0 | Status: DISCONTINUED | OUTPATIENT
Start: 2020-06-26 | End: 2020-06-26

## 2020-06-26 RX ORDER — ATORVASTATIN CALCIUM 80 MG/1
20 TABLET, FILM COATED ORAL AT BEDTIME
Refills: 0 | Status: DISCONTINUED | OUTPATIENT
Start: 2020-06-26 | End: 2020-06-26

## 2020-06-26 RX ORDER — FLUOXETINE HCL 10 MG
0 CAPSULE ORAL
Qty: 0 | Refills: 0 | DISCHARGE

## 2020-06-26 RX ORDER — SENNA PLUS 8.6 MG/1
0 TABLET ORAL
Qty: 0 | Refills: 0 | DISCHARGE

## 2020-06-26 RX ORDER — METHADONE HYDROCHLORIDE 40 MG/1
0 TABLET ORAL
Qty: 0 | Refills: 0 | DISCHARGE

## 2020-06-26 RX ORDER — ONDANSETRON 8 MG/1
0 TABLET, FILM COATED ORAL
Qty: 0 | Refills: 0 | DISCHARGE

## 2020-06-26 RX ORDER — CLONAZEPAM 1 MG
0 TABLET ORAL
Qty: 0 | Refills: 0 | DISCHARGE

## 2020-06-26 RX ORDER — IPRATROPIUM BROMIDE 0.2 MG/ML
500 SOLUTION, NON-ORAL INHALATION ONCE
Refills: 0 | Status: DISCONTINUED | OUTPATIENT
Start: 2020-06-26 | End: 2020-06-26

## 2020-06-26 RX ORDER — FLUOXETINE HCL 10 MG
20 CAPSULE ORAL DAILY
Refills: 0 | Status: DISCONTINUED | OUTPATIENT
Start: 2020-06-26 | End: 2020-06-26

## 2020-06-26 RX ORDER — ENOXAPARIN SODIUM 100 MG/ML
40 INJECTION SUBCUTANEOUS AT BEDTIME
Refills: 0 | Status: DISCONTINUED | OUTPATIENT
Start: 2020-06-26 | End: 2020-06-26

## 2020-06-26 RX ORDER — ONDANSETRON 8 MG/1
8 TABLET, FILM COATED ORAL EVERY 8 HOURS
Refills: 0 | Status: DISCONTINUED | OUTPATIENT
Start: 2020-06-26 | End: 2020-06-26

## 2020-06-26 RX ORDER — CLONAZEPAM 1 MG
1 TABLET ORAL
Refills: 0 | Status: DISCONTINUED | OUTPATIENT
Start: 2020-06-26 | End: 2020-06-26

## 2020-06-26 RX ORDER — OXYCODONE HYDROCHLORIDE 5 MG/1
30 TABLET ORAL EVERY 12 HOURS
Refills: 0 | Status: DISCONTINUED | OUTPATIENT
Start: 2020-06-26 | End: 2020-06-26

## 2020-06-26 RX ADMIN — Medication 1 MILLIGRAM(S): at 15:36

## 2020-06-26 RX ADMIN — OXYCODONE HYDROCHLORIDE 15 MILLIGRAM(S): 5 TABLET ORAL at 08:33

## 2020-06-26 RX ADMIN — Medication 20 MILLIGRAM(S): at 11:05

## 2020-06-26 RX ADMIN — OXYCODONE HYDROCHLORIDE 15 MILLIGRAM(S): 5 TABLET ORAL at 14:34

## 2020-06-26 NOTE — H&P ADULT - ATTENDING COMMENTS
I saw and evaluated the patient. I have reviewed and agree with the findings and plan of care as documented above in the resident’s note (unless indicated differently below). Any necessary changes were made in the body of the text.    Symptoms have resolved  Possible stress related  Also post-viral syndrome: resolving SARS-CoV-2 infection    Plan:  F/u covid-19 PCR  Repeat EKG later today  Low suspicion for PE given d-dimer and Wells score  No need for CT or V/Q scanning at this time  D/c planning within 24 hrs if symptoms do not recur and if the patient remains stable I saw and evaluated the patient. I have reviewed and agree with the findings and plan of care as documented above in the resident’s note (unless indicated differently below). Any necessary changes were made in the body of the text.    69 yo F pt w/ a relevant hx of multiple malignancies (all in remission), SARS-CoV-2 (+) on 06/01/2020 and complex regional pain syndrome (that makes it difficult for her to ambulate - walks limited distances and w/ a cane). Now coming in with various non-specific complaints. States that she has been under a lot of stress recently; wanted to be retested for covid-19 (as the isolation precautions have been a significant psycho-social stressor - cannot spend time w/ her dog and has to stay isolated from her NOK who live upstairs). Reports that she was unable to get tested out-pt. She has a covid-19 team that follows her (from [facility]). Over the past one week she has been having intermittent palpitations associated with SOB (dyspnea only on the day of presentation and only during the episode of palpitations) - self-limited events. Was told to come in by her covid-19 team for further evaluation when she told them about the aforementioned symptoms.     ROS:  Constitutional: no fevers; no chills  Eyes: no conjunctivitis; no itching  ENT: no dysphagia; no odynophagia  CVS: no PND; no orthopnea; no chest pain; +palpitations  Resp: +SOB (only w/ palps); no coughing  GI: no nausea; no vomiting; no diarrhea; no abd pain  : no dysuria; no hematuria  MSK: no myalgias; no arthralgias  Skin: no rashes; no ulcers  Neuro: no focal weakness; no headache  All other systems reviewed and are negative    PMHx, home medications, SurHx, FHx and Social history as above in the corresponding sections of the note - reviewed and edited where appropriate    Exam:  Vitals: BP = 131/63; P = 92; T = 97.1; RR = 17; SpO2 > 95 on room air  General: appears stated age; cooperative  Eyes: anicteric sclera; moist conjunctiva w/ no lid lag; PERRL; EOMI  HENT: NC/AT; clear oropharynx w/ moist mucous membranes; nL hard/soft palate  Neck: supple w/ FROM; trachea midline; no thyromegaly; no carotid bruits  Lungs: cta b/l with no tachypnea, accessory muscle usage, wheezing, rhonci or rales  CVS: RRR; S1 and S2 w/o MRGs  Abd: BS+; soft; non-tender to palpation x 4; no masses or HSM  Ext: non-edematous; pulses 2+ b/l  Skin: normal temp, turgor and texture; no rashes, ulcers or nodules  Neuro: CN II-XII intact; str grossly intact (pt able to ambulate limited distances even w/o a cane); hyperesthesia-touch-RLE  Psych: appropriate affect; alert and oriented to person, place, time and situation    Labs unrevealing  CXR unremarkable  EKG: NSR; qTC 449    Assessment:  (1) Intermittent palpitations and SOB - resolved prior to ED presentation w/ no recurrence since  (2) Low suspicion for PE (low d-dimer and Wells score of 0)  (3) Recent SARS-CoV-2 infection - possible post-viral syndrome  (4) Hx of multiple malignancies - in remission:  --- Hx of RLE sarcoma, breast ca and parotid gland adenocA  (5) Complex regional pain syndrome involving the RLE  (6) Anxiety    Plan:  (1) F/u covid-19 PCR  (2) Repeat EKG later today  (3) If symptoms reoccur, can consider Tely monitoring   --- Normal EKG and no symptoms since presentation  --- Can consider referral for out-pt holter monitoring at d/c  (4) Low suspicion for PE given d-dimer and Wells score:  --- As such, no need for CT or V/Q scanning at this time  (5) D/c planning later today if symptoms do not reoccur and if the patient remains stable  --- With appropriate return precautions    Code status: full code

## 2020-06-26 NOTE — ED ADULT NURSE NOTE - MUSCULOSKELETAL ASSESSMENT
Render Post-Care Instructions In Note?: yes Detail Level: Detailed Number Of Freeze-Thaw Cycles: 2 freeze-thaw cycles Post-Care Instructions: I reviewed with the patient in detail post-care instructions. Patient is to wear sunprotection, and avoid picking at any of the treated lesions. Pt may apply Vaseline to crusted or scabbing areas. Duration Of Freeze Thaw-Cycle (Seconds): 5 Consent: The patient's consent was obtained including but not limited to risks of crusting, scabbing, blistering, scarring, darker or lighter pigmentary change, recurrence, incomplete removal and infection. - - -

## 2020-06-26 NOTE — DISCHARGE NOTE PROVIDER - CARE PROVIDER_API CALL
Samantha Rosales  INTERNAL MEDICINE  [address] [place] [address]  Phone: [phone]  Fax: [phone]  Established Patient  Follow Up Time: 2 weeks

## 2020-06-26 NOTE — ED ADULT NURSE NOTE - OBJECTIVE STATEMENT
pt is a 70 yof w/ adenocarcinoma of parotid, sarcoma of RLE, HLD, HTN, anxiety here for sob w/ palpitations for the last week. pt states she was recently diagnosed with COVID at [place], and was followed for the last month by them. pt was told to come in for r/o "blood clot". denies fever, cp, abd pain, n/v/d,

## 2020-06-26 NOTE — H&P ADULT - NSICDXPASTMEDICALHX_GEN_ALL_CORE_FT
PAST MEDICAL HISTORY:  Anxiety     High blood cholesterol     Mitral valve regurgitation     Parotid gland adenocarcinoma     Sarcoma of bone of foot, right

## 2020-06-26 NOTE — CHART NOTE - NSCHARTNOTEFT_GEN_A_CORE
<<<RESIDENT DISCHARGE NOTE>>>     KRYSTYNA WILLS  MRN-[id number]    VITAL SIGNS:  T(F): 97.6 (06-26-20 @ 13:08), Max: 98.6 (06-25-20 @ 22:01)  HR: 81 (06-26-20 @ 13:08)  BP: 146/88 (06-26-20 @ 13:08)  SpO2: 97% (06-26-20 @ 13:08)  Weight (kg): 76.9 (06-26-20 @ 02:47)  BMI (kg/m2): 31 (06-26-20 @ 02:47)    PHYSICAL EXAMINATION:  General: in NAD, resting comfortably in bed  Head & Neck: clear conjunctivae  Pulmonary: clear to auscultation bilaterally  Cardiovascular: S1/S2, no M/R/G  Gastrointestinal/Abdomen & Pelvis: abdomen soft, not distended, not tender, bowel sounds appreciated  Neurologic/Motor: alert and oriented    TEST RESULTS:                        13.5   9.72  )-----------( 184      ( 26 Jun 2020 06:57 )             40.2       06-26    141  |  101  |  13  ----------------------------<  113<H>  4.6   |  27  |  0.9    Ca    9.4      26 Jun 2020 06:57  Mg     2.2     06-26    TPro  6.4  /  Alb  3.9  /  TBili  0.2  /  DBili  x   /  AST  35  /  ALT  38  /  AlkPhos  82  06-26      FINAL DISCHARGE INTERVIEW:  Resident(s) Present: Domingo Cook, CANDE Present: (Name:  ___________)    DISCHARGE MEDICATION RECONCILIATION  reviewed with Attending: Nikia Gipson    DISPOSITION:   [ X ] Home,    [  ] Home with Visiting Nursing Services,   [    ]  SNF/ NH,    [   ] Acute Rehab (4A),   [   ] Other (Specify:_________)

## 2020-06-26 NOTE — DISCHARGE NOTE PROVIDER - HOSPITAL COURSE
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 The patient tested positive for COVID-19 on 6/26. The patient was admitted to medicine for evaluation of episodic palpitations and SOB.        The patient did not have hypoxia. Labs did not show evidence of infection or significant electrolyte abnormalities. Her symptoms are most likely attributed to her chronic anxiety. ECG did not show any acute changes. 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 The patient tested positive for COVID-19 on 6/26. The patient was admitted to medicine for evaluation of episodic palpitations and SOB. All workup was negative.        The patient did not have hypoxia and has been afebrile and hemodynamically stable. The patient was treated with nebulizers PRN for SOB. Labs did not show evidence of infection or significant electrolyte abnormalities. ECG was normal. Troponins were not elevated. D-dimer was elevated to 280. Chest X-ray showed no evidence of cardiopulmonary disease. Her symptoms are most likely attributed to her chronic anxiety. The patient is stable for discharge home.

## 2020-06-26 NOTE — DISCHARGE NOTE PROVIDER - NSDCCPCAREPLAN_GEN_ALL_CORE_FT
PRINCIPAL DISCHARGE DIAGNOSIS  Diagnosis: Shortness of breath  Assessment and Plan of Treatment:       SECONDARY DISCHARGE DIAGNOSES  Diagnosis: Anxiety  Assessment and Plan of Treatment:     Diagnosis: COVID-19  Assessment and Plan of Treatment: You were diagnosed with coronavirus (COVID-19). Please quarantine yourself at home for 14 days. Please disinfect surfaces that you touch around your house. Please wear a mask around other people. Return to the ED if you have chest pain or shortness of breath.    Diagnosis: Palpitations  Assessment and Plan of Treatment:

## 2020-06-26 NOTE — H&P ADULT - NSHPPHYSICALEXAM_GEN_ALL_CORE
Vital Signs Last 24 Hrs  T(C): 37 (25 Jun 2020 22:01), Max: 37 (25 Jun 2020 22:01)  T(F): 98.6 (25 Jun 2020 22:01), Max: 98.6 (25 Jun 2020 22:01)  HR: 86 (25 Jun 2020 22:01) (86 - 86)  BP: 157/76 (25 Jun 2020 22:01) (157/76 - 157/76)  BP(mean): --  RR: 20 (25 Jun 2020 22:01) (20 - 20)  SpO2: 95% (25 Jun 2020 22:01) (95% - 95%)    PHYSICAL EXAM:  GENERAL: NAD, lying in bed comfortably  HEAD:  Atraumatic, Normocephalic  EYES: EOMI, PERRLA, conjunctiva and sclera clear  ENT: Moist mucous membranes  NECK: Supple, No JVD  CHEST/LUNG: Clear to auscultation bilaterally; No rales, rhonchi, wheezing, or rubs. Unlabored respirations  HEART: Regular rate and rhythm; No murmurs, rubs, or gallops  ABDOMEN: Bowel sounds present; Soft, Nontender, Nondistended. No hepatomegally  EXTREMITIES:  2+ Peripheral Pulses, brisk capillary refill. No clubbing, cyanosis, or edema  NERVOUS SYSTEM:  Alert & Oriented X3, speech clear. No deficits   MSK: FROM all 4 extremities, full and equal strength  SKIN: No rashes or lesions Vital Signs Last 24 Hrs  T(C): 37 (25 Jun 2020 22:01), Max: 37 (25 Jun 2020 22:01)  T(F): 98.6 (25 Jun 2020 22:01), Max: 98.6 (25 Jun 2020 22:01)  HR: 86 (25 Jun 2020 22:01) (86 - 86)  BP: 157/76 (25 Jun 2020 22:01) (157/76 - 157/76)  BP(mean): --  RR: 20 (25 Jun 2020 22:01) (20 - 20)  SpO2: 95% (25 Jun 2020 22:01) (95% - 95%)    PHYSICAL EXAM:  GENERAL: NAD, lying in bed comfortably  HEAD:  Atraumatic, Normocephalic  EYES: EOMI, PERRLA, conjunctiva and sclera clear  ENT: Moist mucous membranes  NECK: Supple, No JVD  CHEST/LUNG: Clear to auscultation bilaterally; No rales, rhonchi, wheezing, or rubs. Unlabored respirations  HEART: Regular rate and rhythm; No murmurs, rubs, or gallops  ABDOMEN: Bowel sounds present; Soft, Nontender, Nondistended. No hepatomegally  EXTREMITIES:  2+ Peripheral Pulses, brisk capillary refill. No clubbing, cyanosis, or edema  NERVOUS SYSTEM:  Alert & Oriented X3, speech clear. No deficits   MSK: RLE hypersensitive to touch. No lower extemity weakness  SKIN: No rashes or lesions

## 2020-06-26 NOTE — H&P ADULT - ASSESSMENT
# Adenocarcinoma of the parotid gland  # Sarcoma of the RLE    # Covid-19 positive status # Shortness of breath - no evidence of hypoxia   CXR not suggestive of bacterial or viral pneumonia  Patient admitted to r/o PE, however low clinical suspicion (no hypoxia, no tachycardia)  - Atrovent nebulizer PRN for symptoms of SOB    #DLD  - continue statin    # Adenocarcinoma of the parotid gland  # Sarcoma of the RLE  # chronic pain   - s/p   - chronic pain from her RLE sarcoma  - continue home dose of oxycodone, oxycontin, klonipin  - zofran for nausea.     # Anxiety  - continue home dose prozac    # Covid-19 positive status  No signs of active infection.   - f/u covid swab    #Misc  - DVT Prophylaxis: Lovenox 40mg SQ  - GI Prophylaxis: not indicated   - Diet: DASH  - Activity: as tolerated  - Code Status: Full Code  - Dispo: home if PE ruled out and patients symptoms improve    SOCIAL: patient lives alone, walks independently, comfortable caring out self care.    * MED REC COMPLETED WITH PATIENT*    PCP: Dr. Rosales  Pharmacy: 70 year old female with a Pmhx of adenocarcinoma of the parotid gland s/p resection (remission), sarcoma of her RLE s/p resection + radiation therapy (remission) - complicated by complex regional syndrome, breast cancer s/ partial right mastectomy + radiotherapy (remission) and COVID-19 positive (on June 1st and did not require hospitalization or treatment), DLD and anxiety.     Patient is being admitted for evaluation of episodic palpitations and SOB which completely resolved prior to presentation to the ED.   In the ED patient was not hypoxic, not tachycardic and appears very comfortable on my evaluation.     # Episodes of palpitations + Shortness of breath - no evidence of hypoxia. Symptoms likely attributed to her chronic anxiety.   CXR not suggestive of bacterial or viral pneumonia. EKG: NSR  Patient initially admitted to r/o PE, however low clinical suspicion (no hypoxia, no tachycardia and her SOB has completely resolved)  - Atrovent nebulizer PRN for symptoms of SOB  - CT-A chest was not done in the ED as patient has anaphylactic rxn to contrast.   - If symptoms recur then consider V/Q scan vs LE duplex to evaluate for possible PE    #DLD  - continue statin    # Adenocarcinoma of the parotid gland  # Sarcoma of the RLE complicated by #complex regional syndrome   # hx of breast Ca  All in remission   - continue home dose of oxycodone, oxycontin  - zofran for nausea.     # Anxiety  - continue home dose prozac and klonipin     # Covid-19 positive status  No signs of active infection.   - f/u covid swab    #Misc  - DVT Prophylaxis: Lovenox 40mg SQ  - GI Prophylaxis: not indicated   - Diet: DASH  - Activity: as tolerated  - Code Status: Full Code  - Dispo: home in 24 hours if patient is no longer having symptoms.     SOCIAL: Patient lives in 1 of the 2 family houses along side her sister  She is mostly homebound due to her chronic pain and usually uses a cane to walk.  * MED REC COMPLETED WITH PATIENT*    PCP: Dr. Rosales

## 2020-06-26 NOTE — DISCHARGE NOTE NURSING/CASE MANAGEMENT/SOCIAL WORK - PATIENT PORTAL LINK FT
You can access the FollowMyHealth Patient Portal offered by [facility] by registering at the following website: [email or web address]. By joining [employer]’s FollowMyHealth portal, you will also be able to view your health information using other applications (apps) compatible with our system.

## 2020-06-26 NOTE — DISCHARGE NOTE PROVIDER - NSDCMRMEDTOKEN_GEN_ALL_CORE_FT
KlonoPIN 1 mg oral tablet: 1 tab(s) orally 2 times a day  Livalo 2 mg oral tablet:   ondansetron:   oxyCODONE: 15 milligram(s) orally every 6 hours, As Needed  OxyCONTIN 30 mg oral tablet, extended release: 1 tab(s) orally once a day (at bedtime)  PROzac 20 mg oral capsule: 1 cap(s) orally once a day KlonoPIN 1 mg oral tablet: 1 tab(s) orally 2 times a day  Livalo 2 mg oral tablet:   oxyCODONE: 15 milligram(s) orally every 6 hours, As Needed  OxyCONTIN 30 mg oral tablet, extended release: 1 tab(s) orally once a day (at bedtime)  PROzac 20 mg oral capsule: 1 cap(s) orally once a day

## 2020-06-26 NOTE — H&P ADULT - HISTORY OF PRESENT ILLNESS
70 year old female with a Pmhx of adenocarcinoma of the parotid gland, sarcoma of her RLE and COVID-19 positive (did not require hospitalization or treatment)    CC:    History goes back to:       Of note:      ROS is positive for:     ROS is negative for:     In the ED: patient was hemodynamically stable, spo2=95% on room air and afebrile.   Labs work was only significant for slightly elevated D-dimer.   CXR unofficial read): no acute cardiopulmonary disease    Patient is admitted to r/o pulmonary embolism. 70 year old female with a Pmhx of adenocarcinoma of the parotid gland, sarcoma of her RLE and COVID-19 positive (did not require hospitalization or treatment), DLD, anxiety.     CC:    History goes back to:       Of note:      ROS is positive for:     ROS is negative for: No signs of active infection, no chest pain, no  or GI symptoms.     In the ED: patient was hemodynamically stable, spo2=95% on room air and afebrile.   Labs work was only significant for slightly elevated D-dimer.   CXR unofficial read): no acute cardiopulmonary disease    Patient is admitted to r/o pulmonary embolism. 70 year old female with a Pmhx of adenocarcinoma of the parotid gland s/p resection (remission), sarcoma of her RLE s/p resection + radiation therapy (remission) - complicated by complex regional syndrome, breast cancer s/ partial right mastectomy + radiotherapy (remission) and COVID-19 positive (on June 1st and did not require hospitalization or treatment), DLD and anxiety.     CC: Episode of palpitations associated with mild shortness of breath    History goes back to: 1 week prior to presentation when patient started having episodic palpitaions at rest, no associated with SOB or chest pains. Palpitations would resolve on their own.   On day of admission patient reports feeling palpitations + SOB at rest. She went to bed and when she woke up at 8pm her symptoms had resolved.    She then received a call a nurse at [facility] (who has been following her up with daily phone calls since she tested positive for COVID). She informed the nurse of her symptoms. Nurse recommended she present to the ED.     ROS is positive for: as described above    ROS is negative for: No signs of active infection, no fever or chills, no new cough no chest pain, no  or GI symptoms. No decreased ambulation, no lower extremity swelling.   No new medications and no skipped doses of her chronic pain and anxiety medications.     In the ED: patient was hemodynamically stable, spo2=95% on room air and afebrile.   Labs work was only significant for slightly elevated D-dimer.   EKG: NSR  troponin negative  CXR unofficial read): no acute cardiopulmonary disease

## 2020-06-26 NOTE — H&P ADULT - NSHPLABSRESULTS_GEN_ALL_CORE
13.7   7.85  )-----------( 181      ( 25 Jun 2020 23:12 )             40.7   06-25    138  |  99  |  12  ----------------------------<  98  4.5   |  26  |  1.0    Ca    9.2      25 Jun 2020 23:12    TPro  6.7  /  Alb  4.0  /  TBili  0.2  /  DBili  x   /  AST  37  /  ALT  40  /  AlkPhos  82  06-25

## 2020-06-26 NOTE — H&P ADULT - NSHPSOCIALHISTORY_GEN_ALL_CORE
Patient lives in 1 of the 2 family houses along side her sister  She is mostly homebound due to her chronic pain and usually uses a cane to walk. Patient lives in 1 of the 2 family houses along side her sister  She is mostly homebound due to her chronic pain and usually uses a cane to walk.  Denies tobacco, alcohol and illicit drug use

## 2020-06-27 ENCOUNTER — TRANSCRIPTION ENCOUNTER (OUTPATIENT)
Age: 70
End: 2020-06-27

## 2021-04-16 ENCOUNTER — INPATIENT (INPATIENT)
Facility: HOSPITAL | Age: 71
LOS: 2 days | Discharge: HOME | End: 2021-04-19
Attending: HOSPITALIST | Admitting: HOSPITALIST
Payer: MEDICARE

## 2021-04-16 VITALS
HEART RATE: 88 BPM | HEIGHT: 62 IN | SYSTOLIC BLOOD PRESSURE: 188 MMHG | RESPIRATION RATE: 18 BRPM | TEMPERATURE: 97 F | WEIGHT: 167.99 LBS | OXYGEN SATURATION: 98 % | DIASTOLIC BLOOD PRESSURE: 97 MMHG

## 2021-04-16 DIAGNOSIS — F41.9 ANXIETY DISORDER, UNSPECIFIED: ICD-10-CM

## 2021-04-16 DIAGNOSIS — E78.00 PURE HYPERCHOLESTEROLEMIA, UNSPECIFIED: ICD-10-CM

## 2021-04-16 DIAGNOSIS — I10 ESSENTIAL (PRIMARY) HYPERTENSION: ICD-10-CM

## 2021-04-16 DIAGNOSIS — C40.31: ICD-10-CM

## 2021-04-16 DIAGNOSIS — R42 DIZZINESS AND GIDDINESS: ICD-10-CM

## 2021-04-16 DIAGNOSIS — E87.5 HYPERKALEMIA: ICD-10-CM

## 2021-04-16 DIAGNOSIS — Z98.890 OTHER SPECIFIED POSTPROCEDURAL STATES: Chronic | ICD-10-CM

## 2021-04-16 DIAGNOSIS — R29.2 ABNORMAL REFLEX: ICD-10-CM

## 2021-04-16 LAB
ALBUMIN SERPL ELPH-MCNC: 4.5 G/DL — SIGNIFICANT CHANGE UP (ref 3.5–5.2)
ALP SERPL-CCNC: 83 U/L — SIGNIFICANT CHANGE UP (ref 30–115)
ALT FLD-CCNC: 28 U/L — SIGNIFICANT CHANGE UP (ref 0–41)
ANION GAP SERPL CALC-SCNC: 8 MMOL/L — SIGNIFICANT CHANGE UP (ref 7–14)
ANION GAP SERPL CALC-SCNC: 9 MMOL/L — SIGNIFICANT CHANGE UP (ref 7–14)
AST SERPL-CCNC: 34 U/L — SIGNIFICANT CHANGE UP (ref 0–41)
BASOPHILS # BLD AUTO: 0.03 K/UL — SIGNIFICANT CHANGE UP (ref 0–0.2)
BASOPHILS NFR BLD AUTO: 0.3 % — SIGNIFICANT CHANGE UP (ref 0–1)
BILIRUB SERPL-MCNC: 0.3 MG/DL — SIGNIFICANT CHANGE UP (ref 0.2–1.2)
BUN SERPL-MCNC: 11 MG/DL — SIGNIFICANT CHANGE UP (ref 10–20)
BUN SERPL-MCNC: 13 MG/DL — SIGNIFICANT CHANGE UP (ref 10–20)
CALCIUM SERPL-MCNC: 9.2 MG/DL — SIGNIFICANT CHANGE UP (ref 8.5–10.1)
CALCIUM SERPL-MCNC: 9.5 MG/DL — SIGNIFICANT CHANGE UP (ref 8.5–10.1)
CHLORIDE SERPL-SCNC: 101 MMOL/L — SIGNIFICANT CHANGE UP (ref 98–110)
CHLORIDE SERPL-SCNC: 106 MMOL/L — SIGNIFICANT CHANGE UP (ref 98–110)
CO2 SERPL-SCNC: 26 MMOL/L — SIGNIFICANT CHANGE UP (ref 17–32)
CO2 SERPL-SCNC: 29 MMOL/L — SIGNIFICANT CHANGE UP (ref 17–32)
CREAT SERPL-MCNC: 0.8 MG/DL — SIGNIFICANT CHANGE UP (ref 0.7–1.5)
CREAT SERPL-MCNC: 0.9 MG/DL — SIGNIFICANT CHANGE UP (ref 0.7–1.5)
EOSINOPHIL # BLD AUTO: 0.13 K/UL — SIGNIFICANT CHANGE UP (ref 0–0.7)
EOSINOPHIL NFR BLD AUTO: 1.2 % — SIGNIFICANT CHANGE UP (ref 0–8)
GLUCOSE SERPL-MCNC: 107 MG/DL — HIGH (ref 70–99)
GLUCOSE SERPL-MCNC: 115 MG/DL — HIGH (ref 70–99)
HCT VFR BLD CALC: 44.5 % — SIGNIFICANT CHANGE UP (ref 37–47)
HGB BLD-MCNC: 14.4 G/DL — SIGNIFICANT CHANGE UP (ref 12–16)
IMM GRANULOCYTES NFR BLD AUTO: 0.4 % — HIGH (ref 0.1–0.3)
LYMPHOCYTES # BLD AUTO: 47.6 % — SIGNIFICANT CHANGE UP (ref 20.5–51.1)
LYMPHOCYTES # BLD AUTO: 5.09 K/UL — HIGH (ref 1.2–3.4)
MCHC RBC-ENTMCNC: 29.7 PG — SIGNIFICANT CHANGE UP (ref 27–31)
MCHC RBC-ENTMCNC: 32.4 G/DL — SIGNIFICANT CHANGE UP (ref 32–37)
MCV RBC AUTO: 91.8 FL — SIGNIFICANT CHANGE UP (ref 81–99)
MONOCYTES # BLD AUTO: 0.64 K/UL — HIGH (ref 0.1–0.6)
MONOCYTES NFR BLD AUTO: 6 % — SIGNIFICANT CHANGE UP (ref 1.7–9.3)
NEUTROPHILS # BLD AUTO: 4.76 K/UL — SIGNIFICANT CHANGE UP (ref 1.4–6.5)
NEUTROPHILS NFR BLD AUTO: 44.5 % — SIGNIFICANT CHANGE UP (ref 42.2–75.2)
NRBC # BLD: 0 /100 WBCS — SIGNIFICANT CHANGE UP (ref 0–0)
PLATELET # BLD AUTO: 218 K/UL — SIGNIFICANT CHANGE UP (ref 130–400)
POTASSIUM SERPL-MCNC: 4.5 MMOL/L — SIGNIFICANT CHANGE UP (ref 3.5–5)
POTASSIUM SERPL-MCNC: 5.4 MMOL/L — HIGH (ref 3.5–5)
POTASSIUM SERPL-SCNC: 4.5 MMOL/L — SIGNIFICANT CHANGE UP (ref 3.5–5)
POTASSIUM SERPL-SCNC: 5.4 MMOL/L — HIGH (ref 3.5–5)
PROT SERPL-MCNC: 7.3 G/DL — SIGNIFICANT CHANGE UP (ref 6–8)
RAPID RVP RESULT: SIGNIFICANT CHANGE UP
RBC # BLD: 4.85 M/UL — SIGNIFICANT CHANGE UP (ref 4.2–5.4)
RBC # FLD: 12.6 % — SIGNIFICANT CHANGE UP (ref 11.5–14.5)
SARS-COV-2 RNA SPEC QL NAA+PROBE: SIGNIFICANT CHANGE UP
SODIUM SERPL-SCNC: 139 MMOL/L — SIGNIFICANT CHANGE UP (ref 135–146)
SODIUM SERPL-SCNC: 140 MMOL/L — SIGNIFICANT CHANGE UP (ref 135–146)
TROPONIN T SERPL-MCNC: <0.01 NG/ML — SIGNIFICANT CHANGE UP
WBC # BLD: 10.69 K/UL — SIGNIFICANT CHANGE UP (ref 4.8–10.8)
WBC # FLD AUTO: 10.69 K/UL — SIGNIFICANT CHANGE UP (ref 4.8–10.8)

## 2021-04-16 PROCEDURE — 99283 EMERGENCY DEPT VISIT LOW MDM: CPT

## 2021-04-16 PROCEDURE — 71045 X-RAY EXAM CHEST 1 VIEW: CPT | Mod: 26

## 2021-04-16 PROCEDURE — 99285 EMERGENCY DEPT VISIT HI MDM: CPT | Mod: CS

## 2021-04-16 PROCEDURE — 99223 1ST HOSP IP/OBS HIGH 75: CPT

## 2021-04-16 PROCEDURE — 70450 CT HEAD/BRAIN W/O DYE: CPT | Mod: 26,MA

## 2021-04-16 RX ORDER — ESCITALOPRAM OXALATE 10 MG/1
10 TABLET, FILM COATED ORAL DAILY
Refills: 0 | Status: DISCONTINUED | OUTPATIENT
Start: 2021-04-16 | End: 2021-04-19

## 2021-04-16 RX ORDER — FLUOXETINE HCL 10 MG
1 CAPSULE ORAL
Qty: 0 | Refills: 0 | DISCHARGE

## 2021-04-16 RX ORDER — OXYCODONE HYDROCHLORIDE 5 MG/1
30 TABLET ORAL
Refills: 0 | Status: DISCONTINUED | OUTPATIENT
Start: 2021-04-16 | End: 2021-04-19

## 2021-04-16 RX ORDER — CLONAZEPAM 1 MG
1 TABLET ORAL
Refills: 0 | Status: DISCONTINUED | OUTPATIENT
Start: 2021-04-16 | End: 2021-04-19

## 2021-04-16 RX ORDER — ENOXAPARIN SODIUM 100 MG/ML
40 INJECTION SUBCUTANEOUS DAILY
Refills: 0 | Status: DISCONTINUED | OUTPATIENT
Start: 2021-04-16 | End: 2021-04-19

## 2021-04-16 RX ORDER — OXYCODONE HYDROCHLORIDE 5 MG/1
15 TABLET ORAL EVERY 6 HOURS
Refills: 0 | Status: DISCONTINUED | OUTPATIENT
Start: 2021-04-16 | End: 2021-04-19

## 2021-04-16 RX ORDER — PITAVASTATIN CALCIUM 1.04 MG/1
0 TABLET, FILM COATED ORAL
Qty: 0 | Refills: 0 | DISCHARGE

## 2021-04-16 RX ORDER — OXYCODONE HYDROCHLORIDE 5 MG/1
15 TABLET ORAL
Qty: 0 | Refills: 0 | DISCHARGE

## 2021-04-16 RX ORDER — HYDRALAZINE HCL 50 MG
25 TABLET ORAL EVERY 8 HOURS
Refills: 0 | Status: DISCONTINUED | OUTPATIENT
Start: 2021-04-16 | End: 2021-04-19

## 2021-04-16 RX ORDER — MECLIZINE HCL 12.5 MG
25 TABLET ORAL ONCE
Refills: 0 | Status: COMPLETED | OUTPATIENT
Start: 2021-04-16 | End: 2021-04-16

## 2021-04-16 RX ORDER — CHLORHEXIDINE GLUCONATE 213 G/1000ML
1 SOLUTION TOPICAL
Refills: 0 | Status: DISCONTINUED | OUTPATIENT
Start: 2021-04-16 | End: 2021-04-19

## 2021-04-16 RX ORDER — SODIUM CHLORIDE 9 MG/ML
1000 INJECTION INTRAMUSCULAR; INTRAVENOUS; SUBCUTANEOUS ONCE
Refills: 0 | Status: COMPLETED | OUTPATIENT
Start: 2021-04-16 | End: 2021-04-16

## 2021-04-16 RX ORDER — MECLIZINE HCL 12.5 MG
25 TABLET ORAL EVERY 8 HOURS
Refills: 0 | Status: DISCONTINUED | OUTPATIENT
Start: 2021-04-16 | End: 2021-04-19

## 2021-04-16 RX ORDER — ATORVASTATIN CALCIUM 80 MG/1
10 TABLET, FILM COATED ORAL AT BEDTIME
Refills: 0 | Status: DISCONTINUED | OUTPATIENT
Start: 2021-04-16 | End: 2021-04-19

## 2021-04-16 RX ADMIN — Medication 25 MILLIGRAM(S): at 16:53

## 2021-04-16 RX ADMIN — Medication 25 MILLIGRAM(S): at 12:19

## 2021-04-16 RX ADMIN — OXYCODONE HYDROCHLORIDE 30 MILLIGRAM(S): 5 TABLET ORAL at 22:13

## 2021-04-16 RX ADMIN — OXYCODONE HYDROCHLORIDE 30 MILLIGRAM(S): 5 TABLET ORAL at 21:17

## 2021-04-16 RX ADMIN — SODIUM CHLORIDE 1000 MILLILITER(S): 9 INJECTION INTRAMUSCULAR; INTRAVENOUS; SUBCUTANEOUS at 13:13

## 2021-04-16 RX ADMIN — ATORVASTATIN CALCIUM 10 MILLIGRAM(S): 80 TABLET, FILM COATED ORAL at 21:22

## 2021-04-16 NOTE — CONSULT NOTE ADULT - SUBJECTIVE AND OBJECTIVE BOX
Neurology consult note    Name  KRYSTYNA WILLS    HPI:    A 69 y/o female with a PMH of  adenocarcinoma of the parotid gland s/p resection (remission), sarcoma of her RLE s/p resection + radiation therapy (remission) - complicated by complex regional syndrome, breast cancer s/ partial right mastectomy + radiotherapy (remission) and COVID-19 positive (on June 1st), HLD, and anxiety (on klonopin) presents to the ED sent in by Dr. Rosales for evaluation of dizziness that began around 2AM after getting up to use the bathroom this morning. pt reports she "felt off balance" x 3 days. pt reports the dizziness feels as if the room is spinning and as if she is going to pass out. pt reports she has to grab on to her surroundings in order to walk. pt reports she has had vertigo in the past, it occurs about 2 times a year and has meclizine at home which she has not been taking. pt denies hx of stroke, use of blood thinners, recent head trauma, fever, chills, neck pain, back pain, jaw pain, chest pain, sob, numbness, tingling, weakness, slurred speech, urinary or bowel retention or incontinence, rashes, leg pain, leg swelling, or use of alcohol.      Neurology Interval History -  -Pt reports feels off balance when she walks, denies room spinning. Pt reports sometimes when she turns her head feels lightheaded. Pt denies ringing on ears, reports sometimes had pain in the past and her doctor gave her drops. Pt denies numbness or tingling, weakness or her upper or lower extremity. Pt denies sensitivity to light. PT reports neck pain with movement side to side.     Vital Signs Last 24 Hrs  T(C): 35.9 (16 Apr 2021 11:36), Max: 35.9 (16 Apr 2021 11:36)  T(F): 96.7 (16 Apr 2021 11:36), Max: 96.7 (16 Apr 2021 11:36)  HR: 88 (16 Apr 2021 11:36) (88 - 88)  BP: 188/97 (16 Apr 2021 11:36) (188/97 - 188/97)  RR: 18 (16 Apr 2021 11:36) (18 - 18)  SpO2: 98% (16 Apr 2021 11:36) (98% - 98%)      Neurological Exam:   Mental status: Awake, alert and oriented x3.     Attention/concentration intact.  No dysarthria, no aphasia.  Fund of knowledge appropriate.    Cranial nerves: pupils equally round and reactive to light, visual fields full, no nystagmus, extraocular muscles intact,  face symmetric, hearing intact to finger rub,  tongue was midline, sternocleidomastoid/shoulder shrug strength bilaterally 5/5.    Motor:  Normal bulk and tone, strength 5/5 in bilateral upper and lower extremities.   strength 5/5.  Rapid alternating movements intact and symmetric.   Sensation: Intact to light touch, vibration.  No neglect.   Coordination: No dysmetria on finger-to-nose and heel-to-shin.  No clumsiness.  Reflexes: hyperrefelxia in upper and lower extremity.   Gait: gait unsteady,  leaning to the left,  Romberg negative    Medications      Lab                        14.4   10.69 )-----------( 218      ( 16 Apr 2021 12:25 )             44.5     04-16    139  |  101  |  13  ----------------------------<  115<H>  5.4<H>   |  29  |  0.9    Ca    9.5      16 Apr 2021 12:25    TPro  7.3  /  Alb  4.5  /  TBili  0.3  /  DBili  x   /  AST  34  /  ALT  28  /  AlkPhos  83  04-16    CAPILLARY BLOOD GLUCOSE        LIVER FUNCTIONS - ( 16 Apr 2021 12:25 )  Alb: 4.5 g/dL / Pro: 7.3 g/dL / ALK PHOS: 83 U/L / ALT: 28 U/L / AST: 34 U/L / GGT: x           < from: CT Head No Cont (04.16.21 @ 12:05) >  IMPRESSION:  No acute intracranial pathology. No evidence of midline shift, mass effect or intracranial hemorrhage.              KIMBERLY VEGA M.D., ATTENDING RADIOLOGIST  This document has been electronically signed. Apr 16 2021 12:25PM    < end of copied text >      < from: Xray Chest 1 View- PORTABLE-Urgent (Xray Chest 1 View- PORTABLE-Urgent .) (04.16.21 @ 12:45) >  Impression:    No radiographic evidence of acute cardiopulmonary disease.        HILLARY DE LEÓN MD; Attending Radiologist  This document has been electronically signed. Apr 16 2021  1:11PM    < end of copied text >

## 2021-04-16 NOTE — H&P ADULT - ATTENDING COMMENTS
Patient seen and examined at bedside independently of PA and agree with the H/P unless otherwise stated     1) Dizziness   -check orthostatic vitals   -Brain MRI   -Neurology consult     2) Hyperreflexia  -check Thyroid panel, B12 and folate levels     3) Elevated BP  -denies history of HTN and not on meds  -monitor BP; would likely need BP meds  -hydralazine prn with parameters     4) Obesity   -BMI > 30  -weight loss and diet modification     5) Anxiety  -stable on Lexapro and Klonopin     dvt and gi ppx       # Progress Note Handoff  PENDING as follows  consults: Neurology   Test: brain MRI   Family discussion: Discussed with patient and sister Galina at bedside in ED; answered all questions and agreeable for inpatient monitoring   Disposition: Home once cleared by Neurology     Attending Physician Dr. Juany Muñoz # [id number]     Spent over 70 mins today's plan of care

## 2021-04-16 NOTE — H&P ADULT - HISTORY OF PRESENT ILLNESS
A 71 y/o female with a PMH of  adenocarcinoma of the parotid gland s/p resection (remission), sarcoma of her RLE s/p resection + radiation therapy (remission) - complicated by complex regional syndrome, breast cancer s/ partial right mastectomy + radiotherapy (remission) and COVID-19 positive (on June 1st), HLD, and anxiety (on klonopin) presents to the ED sent in by Dr. Rosales for evaluation of dizziness that began around 2AM after getting up to use the bathroom this morning. pt reports she "felt off balance" x 3 days. pt reports the dizziness feels as if the room is spinning and as if she is going to pass out. pt reports she has to grab on to her surroundings in order to walk. pt reports she has had vertigo in the past, it occurs about 2 times a year and has meclizine at home which she has not been taking. Pt reports feels off balance when she walks, denies room spinning. Pt reports sometimes when she turns her head feels lightheaded. Pt denies ringing on ears, reports sometimes had pain in the past and her doctor gave her drops. Pt denies numbness or tingling, weakness or her upper or lower extremity. Pt denies sensitivity to light. PT reports neck pain with movement side to side.   Pt denies hx of stroke, use of blood thinners, recent head trauma, fever, chills, neck pain, back pain, jaw pain, chest pain, sob, numbness, tingling, weakness, slurred speech, urinary or bowel retention or incontinence, rashes, leg pain, leg swelling, or use of alcohol.

## 2021-04-16 NOTE — ED PROVIDER NOTE - ATTENDING CONTRIBUTION TO CARE
Pt is 69yo female with severe anxiety who comes in for dizzness since 2AM after waking to use the bathroom.  Was well yesterday.  Notes headaches for 1 year since COVID diagnosis.  No other peripheral symptoms.  Worse with standing.    Exam: normal neuro exam, no nystagmus, normal finger-to-nose, normal gait, tremulous, RRR, CTAB, cap refill <2s  Plan: labs, ct head

## 2021-04-16 NOTE — ED ADULT NURSE NOTE - PMH
Anxiety    High blood cholesterol    Mitral valve regurgitation    Parotid gland adenocarcinoma    Sarcoma of bone of foot, right

## 2021-04-16 NOTE — H&P ADULT - NSHPPHYSICALEXAM_GEN_ALL_CORE
ICU Vital Signs Last 24 Hrs  T(C): 35.9 (16 Apr 2021 11:36), Max: 35.9 (16 Apr 2021 11:36)  T(F): 96.7 (16 Apr 2021 11:36), Max: 96.7 (16 Apr 2021 11:36)  HR: 88 (16 Apr 2021 11:36) (88 - 88)  BP: 188/97 (16 Apr 2021 11:36) (188/97 - 188/97)  RR: 18 (16 Apr 2021 11:36) (18 - 18)  SpO2: 98% (16 Apr 2021 11:36) (98% - 98%)      Neurological Exam:   Mental status: Awake, alert and oriented x3.     Attention/concentration intact.  No dysarthria, no aphasia.  Fund of knowledge appropriate.    Cranial nerves: pupils equally round and reactive to light, visual fields full, no nystagmus, extraocular muscles intact,  face symmetric, hearing intact to finger rub,  tongue was midline, sternocleidomastoid/shoulder shrug strength bilaterally 5/5.  Jaw jerk reflex normal.   Motor:  Normal bulk and tone, strength 5/5 in bilateral upper and lower extremities.   strength 5/5.  Rapid alternating movements intact and symmetric.   Sensation: Intact to light touch, vibration.  No neglect.   Coordination: No dysmetria on finger-to-nose and heel-to-shin.  No clumsiness.  Reflexes: hyperrefelxia in upper and lower extremity.   Gait: gait unsteady,  leaning to the left,  Romberg negative

## 2021-04-16 NOTE — H&P ADULT - NSHPLABSRESULTS_GEN_ALL_CORE
14.4   10.69 )-----------( 218      ( 16 Apr 2021 12:25 )             44.5       04-16    139  |  101  |  13  ----------------------------<  115<H>  5.4<H>   |  29  |  0.9    Ca    9.5      16 Apr 2021 12:25    TPro  7.3  /  Alb  4.5  /  TBili  0.3  /  DBili  x   /  AST  34  /  ALT  28  /  AlkPhos  83  04-16        Lactate Trend      CARDIAC MARKERS ( 16 Apr 2021 12:25 )  x     / <0.01 ng/mL / x     / x     / x            < from: CT Head No Cont (04.16.21 @ 12:05) >    IMPRESSION:  No acute intracranial pathology. No evidence of midline shift, mass effect or intracranial hemorrhage.      KIMBERLY VEGA M.D., ATTENDING RADIOLOGIST  This document has been electronically signed. Apr 16 2021 12:25PM    < end of copied text >    < from: Xray Chest 1 View- PORTABLE-Urgent (Xray Chest 1 View- PORTABLE-Urgent .) (04.16.21 @ 12:45) >    Impression:    No radiographic evidence of acute cardiopulmonary disease.          HILLARY DE LEÓN MD; Attending Radiologist  This document has been electronically signed. Apr 16 2021  1:11PM    < end of copied text >

## 2021-04-16 NOTE — CONSULT NOTE ADULT - ASSESSMENT
A 71 y/o female with a PMH of  adenocarcinoma of the parotid gland s/p resection (remission), sarcoma of her RLE s/p resection + radiation therapy (remission) - complicated by complex regional syndrome, breast cancer s/ partial right mastectomy + radiotherapy (remission) and COVID-19 positive (on June 1st), HLD, and anxiety (on klonopin) presents to the ED sent in by Dr. Rosales for evaluation of dizziness  and hypertension. Pt neck pain with movement and hyporeflexive on exam       Plan:     -MRI  head and neck non contrast   -B12, TSH , folate level   -neurology will follow up  A 71 y/o female with a PMH of  adenocarcinoma of the parotid gland s/p resection (remission), sarcoma of her RLE s/p resection + radiation therapy (remission) - complicated by complex regional syndrome, breast cancer s/ partial right mastectomy + radiotherapy (remission) and COVID-19 positive (on June 1st), HLD, and anxiety (on klonopin) presents to the ED sent in by Dr. Rosales for evaluation of dizziness  and hypertension. Pt neck pain with movement and hyporeflexive on exam       Plan:     -MRI  head and cervical spine non contrast   -B12, TSH , folate level   -neurology will follow up

## 2021-04-16 NOTE — ED PROVIDER NOTE - CARE PLAN
Principal Discharge DX:	Dizziness  Secondary Diagnosis:	Anxiety  Secondary Diagnosis:	Hyperreflexia

## 2021-04-16 NOTE — CONSULT NOTE ADULT - ATTENDING COMMENTS
Patient seen and examined and agree with above except as noted.  Patients history, notes, labs, imaging, vitals and meds reviewed personally.  Patient gives history of vertigo in past with room spinning however this current episode there is no vertigo and she just feels off balance when she tries to walk.  When she was walked she did ok for a few seconds and then became off balance and unsteady needing assistance.  Exam only abnormal for hyperreflexia diffusely with absent jaw jerk reflex suggesting a cervical spine pathology.  She has been dealing with headaches for last 6 months and this may also be related to her neck.    Plan as above

## 2021-04-16 NOTE — ED PROVIDER NOTE - PROGRESS NOTE DETAILS
FF: pt is anaphylaxis to iodine and reports she cannot get a ct with contrast even with ppx tx,. FF: spoke with neuro dr. sidra ibarra, states ct looks unchanged from previous. obtain orthostatics, if positive does not need neuro, if negative admit and will work up for central cause. FF: Dr. Robby stuart is at bedside

## 2021-04-16 NOTE — ED PROVIDER NOTE - NS ED ROS FT
CONST: No fever, chills or bodyaches  EYES: No pain, redness, drainage or visual changes.  ENT: No ear pain or discharge, nasal discharge or congestion. No sore throat  CARD: No chest pain, palpitations  RESP: No SOB, cough, hemoptysis. No hx of asthma or COPD  GI: No abdominal pain, N/V/D  : No urinary symptoms  MS: No joint pain, back pain or extremity pain/injury  SKIN: No rashes  NEURO: No headache, (+) dizziness. No paresthesias or LOC

## 2021-04-16 NOTE — ED PROVIDER NOTE - OBJECTIVE STATEMENT
71 y/o female with a PMH of  adenocarcinoma of the parotid gland s/p resection (remission), sarcoma of her RLE s/p resection + radiation therapy (remission) - complicated by complex regional syndrome, breast cancer s/ partial right mastectomy + radiotherapy (remission) and COVID-19 positive (on June 1st), HLD, and anxiety (on klonopin) presents to the ED sent in by Dr. Rosales for evaluation of dizziness that began around 2AM after getting up to use the bathroom this morning. pt reports she "felt off balance" x 3 days. pt reports the dizziness feels as if the room is spinning and as if she is going to pass out. pt reports she has to grab on to her surroundings in order to walk. pt reports she has had vertigo in the past, it occurs about 2 times a year and has meclizine at home which she has not been taking. pt denies hx of stroke, use of blood thinners, recent head trauma, fever, chills, neck pain, back pain, jaw pain, chest pain, sob, numbness, tingling, weakness, slurred speech, urinary or bowel retention or incontinence, rashes, leg pain, leg swelling, or use of alcohol.

## 2021-04-16 NOTE — H&P ADULT - ASSESSMENT
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 Rosales for evaluation of dizziness  and hypertension. Pt neck pain with movement and hyporeflexive on exam

## 2021-04-16 NOTE — ED PROVIDER NOTE - PHYSICAL EXAMINATION
Physical Exam    Vital Signs: I have reviewed the initial vital signs.  Constitutional: well-nourished, appears stated age, no acute distress  Eyes: Conjunctiva pink, Sclera clear, PERRLA, EOMI without pain. no nystagmus.   Cardiovascular: S1 and S2, regular rate, regular rhythm, well-perfused extremities, radial pulses equal and 2+ b/l.   Respiratory: unlabored respiratory effort, clear to auscultation bilaterally no wheezing, rales and rhonchi. pt is speaking full sentences. no accessory muscle use.   Gastrointestinal: soft, non-tender, nondistended abdomen, no pulsatile mass, normal bowl sounds, no rebound, no guarding, no organomegaly.   Musculoskeletal: supple neck, no lower extremity edema, no calf tenderness, no midline tenderness, no palpable spinal step offs  Integumentary: warm, dry, no rash  Neurologic: awake, alert, cranial nerves II-XII grossly intact, extremities’ motor and sensory functions grossly intact. finger to nose intact. negative pronator drift. (+) romberg. pt feel dizzy when walking and has unsteady gait  Psychiatric: appropriate mood, appropriate affect

## 2021-04-17 LAB
ALBUMIN SERPL ELPH-MCNC: 4.2 G/DL — SIGNIFICANT CHANGE UP (ref 3.5–5.2)
ALP SERPL-CCNC: 77 U/L — SIGNIFICANT CHANGE UP (ref 30–115)
ALT FLD-CCNC: 24 U/L — SIGNIFICANT CHANGE UP (ref 0–41)
ANION GAP SERPL CALC-SCNC: 11 MMOL/L — SIGNIFICANT CHANGE UP (ref 7–14)
AST SERPL-CCNC: 23 U/L — SIGNIFICANT CHANGE UP (ref 0–41)
BILIRUB SERPL-MCNC: 0.4 MG/DL — SIGNIFICANT CHANGE UP (ref 0.2–1.2)
BUN SERPL-MCNC: 12 MG/DL — SIGNIFICANT CHANGE UP (ref 10–20)
CALCIUM SERPL-MCNC: 9.2 MG/DL — SIGNIFICANT CHANGE UP (ref 8.5–10.1)
CHLORIDE SERPL-SCNC: 102 MMOL/L — SIGNIFICANT CHANGE UP (ref 98–110)
CO2 SERPL-SCNC: 26 MMOL/L — SIGNIFICANT CHANGE UP (ref 17–32)
CREAT SERPL-MCNC: 0.8 MG/DL — SIGNIFICANT CHANGE UP (ref 0.7–1.5)
FOLATE SERPL-MCNC: 10.5 NG/ML — SIGNIFICANT CHANGE UP
GLUCOSE SERPL-MCNC: 99 MG/DL — SIGNIFICANT CHANGE UP (ref 70–99)
HCT VFR BLD CALC: 42.2 % — SIGNIFICANT CHANGE UP (ref 37–47)
HGB BLD-MCNC: 13.9 G/DL — SIGNIFICANT CHANGE UP (ref 12–16)
MCHC RBC-ENTMCNC: 29.8 PG — SIGNIFICANT CHANGE UP (ref 27–31)
MCHC RBC-ENTMCNC: 32.9 G/DL — SIGNIFICANT CHANGE UP (ref 32–37)
MCV RBC AUTO: 90.4 FL — SIGNIFICANT CHANGE UP (ref 81–99)
NRBC # BLD: 0 /100 WBCS — SIGNIFICANT CHANGE UP (ref 0–0)
PLATELET # BLD AUTO: 193 K/UL — SIGNIFICANT CHANGE UP (ref 130–400)
POTASSIUM SERPL-MCNC: 4.1 MMOL/L — SIGNIFICANT CHANGE UP (ref 3.5–5)
POTASSIUM SERPL-SCNC: 4.1 MMOL/L — SIGNIFICANT CHANGE UP (ref 3.5–5)
PROT SERPL-MCNC: 6.7 G/DL — SIGNIFICANT CHANGE UP (ref 6–8)
RBC # BLD: 4.67 M/UL — SIGNIFICANT CHANGE UP (ref 4.2–5.4)
RBC # FLD: 12.7 % — SIGNIFICANT CHANGE UP (ref 11.5–14.5)
SODIUM SERPL-SCNC: 139 MMOL/L — SIGNIFICANT CHANGE UP (ref 135–146)
TSH SERPL-MCNC: 1.01 UIU/ML — SIGNIFICANT CHANGE UP (ref 0.27–4.2)
VIT B12 SERPL-MCNC: 396 PG/ML — SIGNIFICANT CHANGE UP (ref 232–1245)
WBC # BLD: 9.88 K/UL — SIGNIFICANT CHANGE UP (ref 4.8–10.8)
WBC # FLD AUTO: 9.88 K/UL — SIGNIFICANT CHANGE UP (ref 4.8–10.8)

## 2021-04-17 PROCEDURE — 99233 SBSQ HOSP IP/OBS HIGH 50: CPT

## 2021-04-17 RX ORDER — FAMOTIDINE 10 MG/ML
20 INJECTION INTRAVENOUS DAILY
Refills: 0 | Status: DISCONTINUED | OUTPATIENT
Start: 2021-04-17 | End: 2021-04-19

## 2021-04-17 RX ORDER — CLONAZEPAM 1 MG
0.5 TABLET ORAL ONCE
Refills: 0 | Status: DISCONTINUED | OUTPATIENT
Start: 2021-04-17 | End: 2021-04-17

## 2021-04-17 RX ADMIN — OXYCODONE HYDROCHLORIDE 15 MILLIGRAM(S): 5 TABLET ORAL at 13:45

## 2021-04-17 RX ADMIN — Medication 1 MILLIGRAM(S): at 21:20

## 2021-04-17 RX ADMIN — ATORVASTATIN CALCIUM 10 MILLIGRAM(S): 80 TABLET, FILM COATED ORAL at 21:17

## 2021-04-17 RX ADMIN — OXYCODONE HYDROCHLORIDE 15 MILLIGRAM(S): 5 TABLET ORAL at 19:44

## 2021-04-17 RX ADMIN — Medication 25 MILLIGRAM(S): at 11:39

## 2021-04-17 RX ADMIN — OXYCODONE HYDROCHLORIDE 15 MILLIGRAM(S): 5 TABLET ORAL at 07:00

## 2021-04-17 RX ADMIN — ESCITALOPRAM OXALATE 10 MILLIGRAM(S): 10 TABLET, FILM COATED ORAL at 12:37

## 2021-04-17 RX ADMIN — OXYCODONE HYDROCHLORIDE 15 MILLIGRAM(S): 5 TABLET ORAL at 15:14

## 2021-04-17 RX ADMIN — Medication 1 MILLIGRAM(S): at 08:46

## 2021-04-17 RX ADMIN — Medication 0.5 MILLIGRAM(S): at 15:20

## 2021-04-17 RX ADMIN — ENOXAPARIN SODIUM 40 MILLIGRAM(S): 100 INJECTION SUBCUTANEOUS at 11:40

## 2021-04-17 RX ADMIN — FAMOTIDINE 20 MILLIGRAM(S): 10 INJECTION INTRAVENOUS at 12:37

## 2021-04-17 RX ADMIN — OXYCODONE HYDROCHLORIDE 15 MILLIGRAM(S): 5 TABLET ORAL at 06:08

## 2021-04-17 NOTE — PHYSICAL THERAPY INITIAL EVALUATION ADULT - MD/RN NOTIFIED
Isha is asking the patient to verify that she should be on Zarelto and Plavix together. Please call to advise   yes

## 2021-04-17 NOTE — PHYSICAL THERAPY INITIAL EVALUATION ADULT - GENERAL OBSERVATIONS, REHAB EVAL
8:40-9:00 Chart reviewed. Patient available to be seen for physical therapy, denies pain, confirmed with RN. Pt rec'd sitting at EOB in NAD. Patient baseline mental status/Alert and oriented to person, place and time

## 2021-04-17 NOTE — PHYSICAL THERAPY INITIAL EVALUATION ADULT - ADDITIONAL COMMENTS
Pt reports she lives alone, but her sister and brother in law lives above her.  Pt says she has 4 steps to enter house, but can not remember if there is railing.  Pt has history of RSD in LE, and has amb with st cane, but mostly in community.   Able to walk household without device.

## 2021-04-17 NOTE — PHYSICAL THERAPY INITIAL EVALUATION ADULT - LEVEL OF INDEPENDENCE: GAIT, REHAB EVAL
-- DO NOT REPLY / DO NOT REPLY ALL --  -- Message is from the [facility]--    COVID-19 Universal Screening: Negative    Request for Medical Clearance    Appointment secured? Yes    Type of surgery: Lumbar 4-5/Lumbar 5 Sacral 1 Anterior Lumbar Interbody Fusion   Location of surgery: [facility]   Date of surgery: 03/04/21  Surgeon Name: Jairon Hooper     Other information:     Caller Information       Type Contact Phone    02/19/2021 03:45 PM CST Phone (Incoming) Maurice Lagunas (Self) [phone] (M)          Alternative phone number: None     Turnaround time given to caller:   \"This message will be sent to [state Provider's name]. The clinical team will fulfill your request as soon as they review your message.\"  
Noted.   Medical clearance form scanned into system   
stand-by assist

## 2021-04-18 LAB
COVID-19 SPIKE DOMAIN AB INTERP: POSITIVE
COVID-19 SPIKE DOMAIN ANTIBODY RESULT: >250 U/ML — HIGH
SARS-COV-2 IGG+IGM SERPL QL IA: >250 U/ML — HIGH
SARS-COV-2 IGG+IGM SERPL QL IA: POSITIVE

## 2021-04-18 PROCEDURE — 70551 MRI BRAIN STEM W/O DYE: CPT | Mod: 26

## 2021-04-18 PROCEDURE — 99233 SBSQ HOSP IP/OBS HIGH 50: CPT

## 2021-04-18 PROCEDURE — 72141 MRI NECK SPINE W/O DYE: CPT | Mod: 26

## 2021-04-18 RX ORDER — AMLODIPINE BESYLATE 2.5 MG/1
5 TABLET ORAL EVERY 24 HOURS
Refills: 0 | Status: DISCONTINUED | OUTPATIENT
Start: 2021-04-18 | End: 2021-04-19

## 2021-04-18 RX ADMIN — OXYCODONE HYDROCHLORIDE 30 MILLIGRAM(S): 5 TABLET ORAL at 00:32

## 2021-04-18 RX ADMIN — ENOXAPARIN SODIUM 40 MILLIGRAM(S): 100 INJECTION SUBCUTANEOUS at 14:42

## 2021-04-18 RX ADMIN — OXYCODONE HYDROCHLORIDE 15 MILLIGRAM(S): 5 TABLET ORAL at 06:05

## 2021-04-18 RX ADMIN — ESCITALOPRAM OXALATE 10 MILLIGRAM(S): 10 TABLET, FILM COATED ORAL at 14:42

## 2021-04-18 RX ADMIN — FAMOTIDINE 20 MILLIGRAM(S): 10 INJECTION INTRAVENOUS at 14:42

## 2021-04-18 RX ADMIN — Medication 2 MILLIGRAM(S): at 10:28

## 2021-04-18 RX ADMIN — OXYCODONE HYDROCHLORIDE 15 MILLIGRAM(S): 5 TABLET ORAL at 20:48

## 2021-04-18 RX ADMIN — Medication 1 MILLIGRAM(S): at 20:13

## 2021-04-18 RX ADMIN — ATORVASTATIN CALCIUM 10 MILLIGRAM(S): 80 TABLET, FILM COATED ORAL at 22:55

## 2021-04-18 RX ADMIN — Medication 25 MILLIGRAM(S): at 20:14

## 2021-04-18 RX ADMIN — OXYCODONE HYDROCHLORIDE 30 MILLIGRAM(S): 5 TABLET ORAL at 23:30

## 2021-04-18 RX ADMIN — OXYCODONE HYDROCHLORIDE 15 MILLIGRAM(S): 5 TABLET ORAL at 14:42

## 2021-04-18 RX ADMIN — OXYCODONE HYDROCHLORIDE 15 MILLIGRAM(S): 5 TABLET ORAL at 21:18

## 2021-04-18 RX ADMIN — OXYCODONE HYDROCHLORIDE 15 MILLIGRAM(S): 5 TABLET ORAL at 15:34

## 2021-04-18 RX ADMIN — OXYCODONE HYDROCHLORIDE 30 MILLIGRAM(S): 5 TABLET ORAL at 22:55

## 2021-04-18 NOTE — PROGRESS NOTE ADULT - ASSESSMENT
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 Rosales for evaluation of dizziness that began around 2AM after getting up to use the bathroom this morning. pt reports she "felt off balance" x 3 days. pt reports the dizziness feels as if the room is spinning and as if she is going to pass out. pt reports she has to grab on to her surroundings in order to walk. pt reports she has had vertigo in the past, it occurs about 2 times a year and has meclizine at home which she has not been taking. Pt reports feels off balance when she walks, denies room spinning. Pt reports sometimes when she turns her head feels lightheaded. Pt denies ringing on ears, reports sometimes had pain in the past and her doctor gave her drops. Pt denies numbness or tingling, weakness or her upper or lower extremity. Pt denies sensitivity to light. PT reports neck pain with movement side to side.   Pt denies hx of stroke, use of blood thinners, recent head trauma, fever, chills, neck pain, back pain, jaw pain, chest pain, sob, numbness, tingling, weakness, slurred speech, urinary or bowel retention or incontinence, rashes, leg pain, leg swelling, or use of alcohol.      1) Dizziness   -check orthostatic vitals   -Brain MRI   -Neurology consult     2) Hyperreflexia  -check Thyroid panel, B12 and folate levels --- Pending     3) Elevated BP  -denies history of HTN and not on meds (appears that patient was undiagnosed)  -monitor BP; would likely need BP meds  -hydralazine prn with parameters     4) Obesity   -BMI > 30  -weight loss and diet modification     5) Anxiety  -stable on Lexapro and Klonopin     6) Hyperkalemia   -rechecked, normalized     dvt and gi ppx       # Progress Note Handoff  PENDING as follows  consults: Neurology   Test: MRI brain   Family discussion: discussed with sister yesterday in ED; unavailable today but aware of the current plan of care   Disposition: home in 24 hrs     Attending Physician Dr. Juany Muñoz # [id number]     Spent over 35 mins today's plan of care 
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 Rosales for evaluation of dizziness that began around 2AM after getting up to use the bathroom this morning. pt reports she "felt off balance" x 3 days. pt reports the dizziness feels as if the room is spinning and as if she is going to pass out. pt reports she has to grab on to her surroundings in order to walk. pt reports she has had vertigo in the past, it occurs about 2 times a year and has meclizine at home which she has not been taking. Pt reports feels off balance when she walks, denies room spinning. Pt reports sometimes when she turns her head feels lightheaded. Pt denies ringing on ears, reports sometimes had pain in the past and her doctor gave her drops. Pt denies numbness or tingling, weakness or her upper or lower extremity. Pt denies sensitivity to light. PT reports neck pain with movement side to side.   Pt denies hx of stroke, use of blood thinners, recent head trauma, fever, chills, neck pain, back pain, jaw pain, chest pain, sob, numbness, tingling, weakness, slurred speech, urinary or bowel retention or incontinence, rashes, leg pain, leg swelling, or use of alcohol.      1) Dizziness   -check orthostatic vitals   -Brain MRI at [facility]  -Neurology follow up     2) Hyperreflexia  -TSH 1.01  -B12 396  -folate 10.5    3) Elevated BP  -denies history of HTN and not on meds (appears that patient was undiagnosed)  -explained to patient the importance of controlling BP  -advised weight loss and diet modification   -hydralazine prn with parameters     4) Obesity   -BMI > 30  -weight loss and diet modification     5) Anxiety  -stable on Lexapro and Klonopin     6) Hyperkalemia   -rechecked, normalized     dvt and gi ppx       # Progress Note Handoff  PENDING as follows  consults: Neurology follow up   Test: MRI brain   Family discussion: discussed with patient who comprehends her medical care  Disposition: home in 24 hrs     Attending Physician Dr. Juany Muñoz # [id number]     Spent over 35 mins today's plan of care

## 2021-04-18 NOTE — PROGRESS NOTE ADULT - SUBJECTIVE AND OBJECTIVE BOX
KRYSTYNA WILLS  70y  Female      Patient is a 70y old  Female who presents with a chief complaint of dizziness, hypertension (16 Apr 2021 15:43)      INTERVAL HPI/OVERNIGHT EVENTS:    pt seen and examined at bedside this morning   -doing well  -still with elevated BP; would need to be on BP meds (pt still not sure of bp meds, explained the importance of controlling BP)  -weight loss and diet modification   -scheduled for MRI brain at [facility]     REVIEW OF SYSTEMS:  -no complaints     Vital Signs Last 24 Hrs  T(C): 36.5 (18 Apr 2021 05:56), Max: 36.9 (17 Apr 2021 21:00)  T(F): 97.7 (18 Apr 2021 05:56), Max: 98.5 (17 Apr 2021 21:00)  HR: 97 (18 Apr 2021 05:56) (85 - 97)  BP: 155/77 (18 Apr 2021 05:56) (155/77 - 169/91)  RR: 16 (18 Apr 2021 05:56) (16 - 16)    PHYSICAL EXAM:  GENERAL: NAD  HEAD:  Atraumatic, Normocephalic  EYES: EOMI, PERRLA, conjunctiva and sclera clear  NERVOUS SYSTEM:  Alert & Oriented X 3  CHEST/LUNG: Clear to percussion bilaterally; No rales, rhonchi, wheezing, or rubs  CV/HEART: Regular rate and rhythm; No murmurs, rubs, or gallops  GI/ABDOMEN: Soft, Nontender, obese abdomen, Bowel sounds present  EXTREMITIES:  2+ Peripheral Pulses, No clubbing, cyanosis, or edema  SKIN: left upper arm above antecubital fossa rash/macular in appearance     LAB:                        13.9   9.88  )-----------( 193      ( 17 Apr 2021 07:58 )             42.2     04-17    139  |  102  |  12  ----------------------------<  99  4.1   |  26  |  0.8    Ca    9.2      17 Apr 2021 07:58    TPro  6.7  /  Alb  4.2  /  TBili  0.4  /  DBili  x   /  AST  23  /  ALT  24  /  AlkPhos  77  04-17    CARDIAC MARKERS ( 16 Apr 2021 12:25 )  x     / <0.01 ng/mL / x     / x     / x          Daily Height in cm: 157.48 (16 Apr 2021 19:15)    Daily   CAPILLARY BLOOD GLUCOSE    LIVER FUNCTIONS - ( 17 Apr 2021 07:58 )  Alb: 4.2 g/dL / Pro: 6.7 g/dL / ALK PHOS: 77 U/L / ALT: 24 U/L / AST: 23 U/L / GGT: x           RADIOLOGY:    Imaging Personally Reviewed:  [Y ] YES  [ ] NO    HEALTH ISSUES - PROBLEM Dx:  Dizziness  Dizziness    Hyperreflexia  Hyperreflexia    Anxiety  Anxiety    Sarcoma of bone of foot, right  Sarcoma of bone of foot, right    High blood cholesterol  High blood cholesterol    Hypertension  Hypertension    Hyperkalemia  Hyperkalemia    MEDICATIONS  (STANDING):  atorvastatin 10 milliGRAM(s) Oral at bedtime  chlorhexidine 4% Liquid 1 Application(s) Topical <User Schedule>  enoxaparin Injectable 40 milliGRAM(s) SubCutaneous daily  escitalopram 10 milliGRAM(s) Oral daily  famotidine    Tablet 20 milliGRAM(s) Oral daily  oxyCODONE  ER Tablet 30 milliGRAM(s) Oral <User Schedule>    MEDICATIONS  (PRN):  clonazePAM  Tablet 1 milliGRAM(s) Oral two times a day PRN anxiety  hydrALAZINE 25 milliGRAM(s) Oral every 8 hours PRN Systolic blood pressure >170  meclizine 25 milliGRAM(s) Oral every 8 hours PRN Dizziness  oxyCODONE    IR 15 milliGRAM(s) Oral every 6 hours PRN Severe Pain (7 - 10)  
  KRYSTYNA WILLS  70y  Female      Patient is a 70y old  Female who presents with a chief complaint of dizziness, hypertension (16 Apr 2021 15:43)      INTERVAL HPI/OVERNIGHT EVENTS:    pt seen and examined at bedside this morning   -scratching left arm and rash worsening; appears macular rash  -refusing benadryl and Claritin and wants Pepcid   -scheduled for MRI brain today     REVIEW OF SYSTEMS:  -rash/itching left upper arm     Vital Signs Last 24 Hrs  T(C): 36.4 (17 Apr 2021 05:33), Max: 36.9 (16 Apr 2021 19:15)  T(F): 97.6 (17 Apr 2021 05:33), Max: 98.5 (16 Apr 2021 19:15)  HR: 77 (17 Apr 2021 10:52) (77 - 96)  BP: 171/80 (17 Apr 2021 10:52) (168/76 - 188/97)  RR: 16 (17 Apr 2021 05:33) (16 - 18)  SpO2: 98% (16 Apr 2021 11:36) (98% - 98%)    PHYSICAL EXAM:  GENERAL: NAD  HEAD:  Atraumatic, Normocephalic  EYES: EOMI, PERRLA, conjunctiva and sclera clear  NERVOUS SYSTEM:  Alert & Oriented X 3  CHEST/LUNG: Clear to percussion bilaterally; No rales, rhonchi, wheezing, or rubs  CV/HEART: Regular rate and rhythm; No murmurs, rubs, or gallops  GI/ABDOMEN: Soft, Nontender, obese abdomen, Bowel sounds present  EXTREMITIES:  2+ Peripheral Pulses, No clubbing, cyanosis, or edema  SKIN: left upper arm above antecubital fossa rash/macular in appearance     LAB:                        13.9   9.88  )-----------( 193      ( 17 Apr 2021 07:58 )             42.2     04-17    139  |  102  |  12  ----------------------------<  99  4.1   |  26  |  0.8    Ca    9.2      17 Apr 2021 07:58    TPro  6.7  /  Alb  4.2  /  TBili  0.4  /  DBili  x   /  AST  23  /  ALT  24  /  AlkPhos  77  04-17    CARDIAC MARKERS ( 16 Apr 2021 12:25 )  x     / <0.01 ng/mL / x     / x     / x          Daily Height in cm: 157.48 (16 Apr 2021 19:15)    Daily   CAPILLARY BLOOD GLUCOSE    LIVER FUNCTIONS - ( 17 Apr 2021 07:58 )  Alb: 4.2 g/dL / Pro: 6.7 g/dL / ALK PHOS: 77 U/L / ALT: 24 U/L / AST: 23 U/L / GGT: x           RADIOLOGY:    Imaging Personally Reviewed:  [Y ] YES  [ ] NO    HEALTH ISSUES - PROBLEM Dx:  Dizziness  Dizziness    Hyperreflexia  Hyperreflexia    Anxiety  Anxiety    Sarcoma of bone of foot, right  Sarcoma of bone of foot, right    High blood cholesterol  High blood cholesterol    Hypertension  Hypertension    Hyperkalemia  Hyperkalemia    MEDS:  atorvastatin 10 milliGRAM(s) Oral at bedtime  chlorhexidine 4% Liquid 1 Application(s) Topical <User Schedule>  clonazePAM  Tablet 1 milliGRAM(s) Oral two times a day PRN  enoxaparin Injectable 40 milliGRAM(s) SubCutaneous daily  escitalopram 10 milliGRAM(s) Oral daily  famotidine    Tablet 20 milliGRAM(s) Oral daily  hydrALAZINE 25 milliGRAM(s) Oral every 8 hours PRN  LORazepam   Injectable 2 milliGRAM(s) IV Push once PRN  meclizine 25 milliGRAM(s) Oral every 8 hours PRN  oxyCODONE    IR 15 milliGRAM(s) Oral every 6 hours PRN  oxyCODONE  ER Tablet 30 milliGRAM(s) Oral <User Schedule>

## 2021-04-19 ENCOUNTER — TRANSCRIPTION ENCOUNTER (OUTPATIENT)
Age: 71
End: 2021-04-19

## 2021-04-19 VITALS — DIASTOLIC BLOOD PRESSURE: 69 MMHG | SYSTOLIC BLOOD PRESSURE: 117 MMHG | HEART RATE: 79 BPM

## 2021-04-19 PROCEDURE — 99239 HOSP IP/OBS DSCHRG MGMT >30: CPT

## 2021-04-19 RX ORDER — MECLIZINE HCL 12.5 MG
1 TABLET ORAL
Qty: 30 | Refills: 0
Start: 2021-04-19 | End: 2021-04-28

## 2021-04-19 RX ADMIN — OXYCODONE HYDROCHLORIDE 15 MILLIGRAM(S): 5 TABLET ORAL at 13:03

## 2021-04-19 RX ADMIN — ESCITALOPRAM OXALATE 10 MILLIGRAM(S): 10 TABLET, FILM COATED ORAL at 12:35

## 2021-04-19 RX ADMIN — ENOXAPARIN SODIUM 40 MILLIGRAM(S): 100 INJECTION SUBCUTANEOUS at 12:35

## 2021-04-19 RX ADMIN — FAMOTIDINE 20 MILLIGRAM(S): 10 INJECTION INTRAVENOUS at 12:34

## 2021-04-19 RX ADMIN — Medication 1 MILLIGRAM(S): at 10:12

## 2021-04-19 RX ADMIN — OXYCODONE HYDROCHLORIDE 15 MILLIGRAM(S): 5 TABLET ORAL at 12:33

## 2021-04-19 NOTE — DISCHARGE NOTE NURSING/CASE MANAGEMENT/SOCIAL WORK - PATIENT PORTAL LINK FT
You can access the FollowMyHealth Patient Portal offered by [facility] by registering at the following website: [email or web address]. By joining [employer]’s FollowMyHealth portal, you will also be able to view your health information using other applications (apps) compatible with our system.

## 2021-04-19 NOTE — DISCHARGE NOTE PROVIDER - CARE PROVIDER_API CALL
Nuno Murphy)  EEGEpilepsy; Neurology  [address], [address]  [place] [address]  Phone: [phone]  Fax: [phone]  Follow Up Time:     Alejandra Hussein)  Neurosurgery  [address] [place] [address], [address]  [place] [address]  Phone: [phone]  Fax: [phone]  Follow Up Time:     Samantha Rosales  INTERNAL MEDICINE  [address] [place] [id number]  Phone: [phone]  Fax: [phone]  Follow Up Time:

## 2021-04-19 NOTE — DISCHARGE NOTE PROVIDER - NSDCHC_MEDRECSTATUS_GEN_ALL_CORE
[facility] encounter  HEMATOLOGY/ONCOLOGY OFFICE VISIT    HISTORY OF PRESENT ILLNESS:  Taylor Vanegas is a 64 year old female who presents for evaluation of breast CA.    She continues to have soreness in her joints with the Femara. This is most pronounced in her feet, ankles, hands, knees, shoulders, back. This is better by itself. She rates her pain as a 3/10 on average over the last week.  Aleve on occasion.    Urinary results have resolved.    She is doing well, without complaints of fevers, chills, nausea, vomiting, chest pain, shortness of breath, or weight loss.    PAST HISTORIES:  Past medical history, surgical history, family history, and social history were reviewed and updated.  Problem List           ICD-10-CM Noted       Oncology Problems    Malignant neoplasm of upper-outer quadrant of right female breast (CMS/HCC) C50.411 4/11/2012    Overview Addendum 2/21/2014 12:10 PM by Hernando Price MD     4/9/12: R core Bx: ILC, grade II, 1.4cm, ER/ND+   4/9/12: axillary LB FNA: suspicious for malig   4/13/12 MRI breast: RUOQ enhance, 5cm max, no direct chest wall involvement, 1.5cm R axillary LN without enhancement; L 2.5cm area of enhancement and rapid washout at 11-12:00   4/19/12: L breast Bx: neg   4/23/12: L MRI: clip confirmed at location of abnormality  4/26/12: R mastectomy, ILC, grade 2, 3.5cm, 4/28 LN, ER/ND+, Her2+, pT2N2, stage IIIA  9/6/12: s/p TCH x 6  11/29/12: s/p XRT  5/13: s/p year Herceptin  6/13: Started Arimidex, arthralgias  8/13: Aromasin, arthralgias  10/13: Femara                   Cancer Staging Summary for Taylor Vanegas     Malignant neoplasm of upper-outer quadrant of right female breast (CMS/HCC)     Stage Date Classification Stage Status    Not entered Pathologic Stage IIIA (T2, N2a, cM0) Signed by Hernando Price MD on 6/7/16                MEDICATIONS / ALLERGIES:  Medications were reviewed.    Review of systems:  Constitutional: Negative for fever, chills, change in  appetite or fatigue.  Skin: Negative for rash or wounds.   HEENT: Negative for eye drainage, rhinorrhea, ear pain, sore throat or neck pain.  Respiratory: Negative for cough, wheezing or shortness of breath.    Cardiovascular: Negative for chest pain, chest pressure, palpitations or diaphoresis.   Gastrointestinal: Negative for nausea, vomiting, diarrhea, abdominal pain, black or tarry stools.  Genitourinary: Negative for dysuria, urgency, frequency, hematuria or flank pain.  Extremities: Negative for joint swelling or joint pain.  Neurologic: Negative for change in sensory or motor function.  Negative for headache, change in gait, vertigo, vision or speech.  Endocrine: Negative for heat or cold intolerance, weight loss or gain.  Hematological: Negative for bleeding, bruising or lymphadenopathy.  Psychiatric: Negative for change in affect, anxiety, depression, mentation or sleep disturbance.    PHYSICAL EXAM:  Vital Signs:   Oncology Encounter Vitals [06/19/18 1009]   ONC OP Encounter Vitals Group      BP (!) 148/106      Pulse 88      Resp 16      Temp 98.2 °F (36.8 °C)      Temp src Oral      SpO2 100 %      Weight 120 lb 2.4 oz (54.5 kg)      Height       Pain Score 3-4      Pain Location       Pain Education?       BSA (Calculated - m2) - [employer]       BMI (Calculated)      ECOG Performance Status: 0 - Fully active, able to carry on all predisease activities without restrictions.  General: The patient is alert, well-developed, well-nourished, no distress.  Skin:  Warm, normal color, normal texture, normal turgor and without rash.  Head:  Normocephalic, atraumatic.  Neck:  Supple with no significant adenopathy  Eyes: Normal conjunctivae and sclerae. Pupils equal, round, reactive to light and accommodation, Extraocular movements intact.  ENT:  Mucous membranes are moist. Normal nose,mouth and throat.  Cardiovascular:  Regular rate and rhythm, no murmurs, gallops or rubs.   Respiratory:  Normal respiratory  effort. Clear to auscultation. No wheezes, rales, or rhonchi.  Abdomen: Abdomen is soft and non-tender with active bowel sounds. There is no detected organomegaly, masses, or ascites.  Extremities: No clubbing, no cyanosis, no edema.  Normal muscle tone and development bilaterally.   Lymph: No cervical, supraclavicular, axillary, inguinal lymphadenopathy.  Neurologic: Motor strength normal.  Coordination normal.  No tremor noted.  Psychiatric:   Cooperative.  Appropriate mood and affect.  Normal judgment.    DATA REVIEWED:  Laboratory Data:    Recent Labs  Lab 06/19/18  0951   WBC 5.5   RBC 5.24*   HGB 15.2   HCT 44.6   MCV 85.1      ANEUT 2.9   ALYMS 1.8   DANO 0.5   AEOS 0.2   ABASO 0.0       Recent Labs  Lab 06/19/18  0951   GLUCOSE 99   SODIUM 146*   POTASSIUM 4.4   CHLORIDE 107   CO2 28   BUN 16   CREATININE 0.98*   CALCIUM 9.3   TOTPROTEIN 7.9   ALBUMIN 4.0   AST 23   ALKPT 86   GPT 26       Recent Labs  Lab 06/19/18  0951   ANIONGAP 15   GLOB 3.9   BILIRUBIN 0.4       Imaging Studies  DEXA T-2.2 (was 2.4), personally viewed  L mammo neg    ASSESSMENT AND PLAN:  1. Breast CA: She has completed her every 3 week Herceptin. She is now on Femara, after Arimidex and Aromasin. L mammo in June 2019. Will continue to observe for toxicities.    2. Neuropathy: Mild and stable to slightly improving.    3. Pains: Improved. Aleve prn. Elevated RF is likely false positive. She was on the [place] testosterone trial, [id number].     4. Osteopenia:  Continue calcium, Vit D.  OK to continue to hold bone modifying agent for now due to tolerance.    F/u 6 mos    The patient indicates understanding of the diagnosis and agrees with the plan of care. The patient is encouraged to call between now and next visit with any problems, questions or concerns that arise.    Admission Reconciliation is Completed  Discharge Reconciliation is Completed

## 2021-04-19 NOTE — DISCHARGE NOTE PROVIDER - CARE PROVIDERS DIRECT ADDRESSES
[email or web address]

## 2021-04-19 NOTE — DISCHARGE NOTE PROVIDER - HOSPITAL COURSE
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 Rosales for evaluation of dizziness that began around 2AM after getting up to use the bathroom this morning. pt reports she "felt off balance" x 3 days. pt reports the dizziness feels as if the room is spinning and as if she is going to pass out. pt reports she has to grab on to her surroundings in order to walk. pt reports she has had vertigo in the past, it occurs about 2 times a year and has meclizine at home which she has not been taking. Pt reports feels off balance when she walks, denies room spinning. Pt reports sometimes when she turns her head feels lightheaded. Pt denies ringing on ears, reports sometimes had pain in the past and her doctor gave her drops. Pt denies numbness or tingling, weakness or her upper or lower extremity. Pt denies sensitivity to light. PT reports neck pain with movement side to side.   Pt denies hx of stroke, use of blood thinners, recent head trauma, fever, chills, neck pain, back pain, jaw pain, chest pain, sob, numbness, tingling, weakness, slurred speech, urinary or bowel retention or incontinence, rashes, leg pain, leg swelling, or use of alcohol.      1) Dizziness/peripheral vertigo   -bp stable  -meclizine prn   -Brain MRI with no acute abnormality  -cervical MRI results consistent with cervical stenosis  -copies of MRI given to patient and discussed with her; aware of outpatient follow up with Neurology and Neurosurgery   -pt denies any arm tingling and or numbness     2) Hyperreflexia and Cervical Stenosis   -TSH 1.01  -B12 396  -folate 10.5  -outpatient follow up with Neurosurgery     3) Elevated BP  -resolved  -bp remained stable with no need for bp  meds   -patient will not be discharged on bp meds     4) Obesity   -BMI > 30  -weight loss and diet modification     5) Anxiety  -stable on Lexapro and Klonopin     6) Hyperkalemia   -rechecked, normalized     dvt and gi ppx     pt seen and examined multiple times today  stable for home d/c and is in full agreement     spent over 35 mins d/c planning

## 2021-04-19 NOTE — DISCHARGE NOTE PROVIDER - NSDCCPCAREPLAN_GEN_ALL_CORE_FT
PRINCIPAL DISCHARGE DIAGNOSIS  Diagnosis: Dizziness  Assessment and Plan of Treatment: resolved; keep yourself hydrated. Brain MRI is negative for any acute abnormality      SECONDARY DISCHARGE DIAGNOSES  Diagnosis: Anxiety  Assessment and Plan of Treatment: continue with home meds

## 2021-04-19 NOTE — DISCHARGE NOTE PROVIDER - NSDCMRMEDTOKEN_GEN_ALL_CORE_FT
KlonoPIN 1 mg oral tablet: 1 tab(s) orally 2 times a day, As Needed  Lexapro 10 mg oral tablet: 1 tab(s) orally once a day  Livalo 2 mg oral tablet: 1 tab(s) orally once a day  oxyCODONE 15 mg oral tablet: 1 tab(s) orally every 6 hours, As Needed  OxyCONTIN 30 mg oral tablet, extended release: 1 tab(s) orally once a day (at bedtime)

## 2021-04-19 NOTE — DISCHARGE NOTE PROVIDER - PROVIDER TOKENS
PROVIDER:[TOKEN:[[id number]]],PROVIDER:[TOKEN:[[id number]]],PROVIDER:[TOKEN:[[id number]]]

## 2021-04-23 DIAGNOSIS — E87.5 HYPERKALEMIA: ICD-10-CM

## 2021-04-23 DIAGNOSIS — H81.399 OTHER PERIPHERAL VERTIGO, UNSPECIFIED EAR: ICD-10-CM

## 2021-04-23 DIAGNOSIS — Z85.3 PERSONAL HISTORY OF MALIGNANT NEOPLASM OF BREAST: ICD-10-CM

## 2021-04-23 DIAGNOSIS — R26.81 UNSTEADINESS ON FEET: ICD-10-CM

## 2021-04-23 DIAGNOSIS — Z86.16 PERSONAL HISTORY OF COVID-19: ICD-10-CM

## 2021-04-23 DIAGNOSIS — Z85.89 PERSONAL HISTORY OF MALIGNANT NEOPLASM OF OTHER ORGANS AND SYSTEMS: ICD-10-CM

## 2021-04-23 DIAGNOSIS — R03.0 ELEVATED BLOOD-PRESSURE READING, WITHOUT DIAGNOSIS OF HYPERTENSION: ICD-10-CM

## 2021-04-23 DIAGNOSIS — R29.2 ABNORMAL REFLEX: ICD-10-CM

## 2021-04-23 DIAGNOSIS — E66.9 OBESITY, UNSPECIFIED: ICD-10-CM

## 2021-04-23 DIAGNOSIS — Z90.11 ACQUIRED ABSENCE OF RIGHT BREAST AND NIPPLE: ICD-10-CM

## 2021-04-23 DIAGNOSIS — F41.9 ANXIETY DISORDER, UNSPECIFIED: ICD-10-CM

## 2021-04-23 DIAGNOSIS — M48.02 SPINAL STENOSIS, CERVICAL REGION: ICD-10-CM

## 2021-04-23 DIAGNOSIS — E78.5 HYPERLIPIDEMIA, UNSPECIFIED: ICD-10-CM

## 2021-04-23 DIAGNOSIS — Z85.830 PERSONAL HISTORY OF MALIGNANT NEOPLASM OF BONE: ICD-10-CM

## 2021-04-23 DIAGNOSIS — Z91.041 RADIOGRAPHIC DYE ALLERGY STATUS: ICD-10-CM

## 2021-04-23 DIAGNOSIS — R42 DIZZINESS AND GIDDINESS: ICD-10-CM

## 2021-09-10 NOTE — ED ADULT NURSE NOTE - PERIPHERAL VASCULAR ED EDEMA
EXAM DESCRIPTION:  CT - Abdomen   Pelvis W Contrast - 9/10/2021 5:05 pm

 

CLINICAL HISTORY:  diarrhea, abdominal pain, COVID positive

 

COMPARISON:  Abdomen   Pelvis W Contrast dated 6/21/2018

 

TECHNIQUE:  Biphasic, helical CT imaging of the abdomen and pelvis was performed following 100 ml non
-ionic IV contrast.

 

No oral contrast administered.

 

All CT scans are performed using dose optimization technique as appropriate and may include automated
 exposure control or mA/KV adjustment according to patient size.

 

FINDINGS:  Lung bases are separately detailed in CT angio report.

 

Left hemidiaphragm elevation is present. Exam has significant motion degradation limitation.

 

The liver, spleen and pancreas are without gross abnormality. Multiple gallstones are present in a no
rmal size gallbladder. No wall thickening, edema or pericholecystic fluid. No biliary tree dilatation
.

 

Symmetric renal function is seen with no hydronephrosis or suspicious renal mass. No pyelonephritis o
r acute parenchymal process. Renal cortical cysts are present. Dense calcifications present in the le
ft renal artery. Bladder diverticula seen and a mostly contracted urinary bladder. Enlarged lobulated
 prostate gland is present. No adrenal abnormalities.

 

Gastric lumen is empty. This accentuates gastric wall thickness. Antritis/gastritis cannot be exclude
d. No duodenitis findings. Small bowel shows no dilatation. A few fluid-filled distal small bowel loo
ps are present. Appendicitis is not suspected. Diverticulosis present left side colon without diverti
culitis.

 

  No free air, free fluid or inflammatory stranding.  No mass or bulky lymphadenopathy. Fat filled ri
ght inguinal hernia present.

 

Prominent disc and bone degenerative changes are present.

 

 

IMPRESSION:  No obstruction, free air or surgically emergent finding identifiable.

 

A few fluid-filled small bowel loops are present and enteritis cannot be excluded. Likewise, antritis
/gastritis cannot be excluded.

 

Cholelithiasis without acute gallbladder or biliary tree finding.
EXAM DESCRIPTION:  CT - Chest For Pe Angio - 9/10/2021 5:05 pm

 

CLINICAL HISTORY:

SOB , COVID positive, cough, body aches

 

COMPARISON:  Chest For Pe Angio dated 7/9/2018; Chest Single View dated 9/10/2021

 

TECHNIQUE:  Dynamically enhanced 3 mm thick images of the chest were obtained during administration o
f approximately 150mL Isovue 370 IV contrast. Coronal and oblique MIP reconstruction images were gene
rated and reviewed. Exam utilizes a protocol to evaluate the pulmonary arterial tree.

 

All CT scans are performed using dose optimization technique as appropriate and may include automated
 exposure control or mA/KV adjustment according to patient size.

 

FINDINGS:  Exam has significant motion degradation limitations. No pulmonary emboli are identifiable.


 

The aorta as imaged shows no acute or suspicious finding. No pericardial thickening or effusion. Card
iomegaly is present.

 

Interstitial and alveolar opacities are present in the periphery of the right upper lobe most notable
 in the posteroinferior right upper lobe abutting the minor fissure. Patient has additional interstit
ial and patchy alveolar opacities in each posterior mid and inferior lower lobe. No pleural effusion 
or pleural thickening.

 

No mediastinal or hilar suspicious masses. No chest wall masses or abnormal axillary lymphadenopathy.
 No endobronchial lesions.

 

IMPRESSION:  Limited motion degraded study without evidence for pulmonary emboli.

 

Bilateral pneumonia findings are present and would be consistent with a mild to moderate COVID-19 pne
umonia superimposed on fibrosis.
EXAM DESCRIPTION:  RAD - Chest Single View - 9/10/2021 2:36 pm

 

CLINICAL HISTORY:  SOB, COVID positive

 

COMPARISON:  May 2019

 

TECHNIQUE:  AP portable chest image was obtained 9/10/2021 2:36 pm .

 

FINDINGS:  Patient has a baseline fibro emphysematous lung pattern. Focal interstitial and alveolar o
pacities are present in the mid right lung field believed to be a right upper lobe early pneumonia ab
utting the minor fissure. Minimal patchy lateral right base opacification present as well though this
 is not clearly different from baseline. Left lung field is not clearly different from comparison.

 

 Heart size is upper normal, similar to comparison. Upper lobe vasculature within normal limits. No m
easurable pleural effusion and no pneumothorax. No acute bony abnormality seen. No acute aortic findi
ngs suspected.

 

IMPRESSION:  Right midlung field early pneumonia superimposed on chronic fibrotic lung change.

 

Pattern is not a classic presentation but would be consistent with COVID pneumonia given the provided
 history.
no

## 2021-11-11 PROBLEM — Z00.00 ENCOUNTER FOR PREVENTIVE HEALTH EXAMINATION: Status: ACTIVE | Noted: 2021-11-11

## 2021-11-15 ENCOUNTER — APPOINTMENT (OUTPATIENT)
Dept: CARDIOLOGY | Facility: CLINIC | Age: 71
End: 2021-11-15
Payer: MEDICARE

## 2021-11-15 ENCOUNTER — RESULT CHARGE (OUTPATIENT)
Age: 71
End: 2021-11-15

## 2021-11-15 VITALS
BODY MASS INDEX: 29.44 KG/M2 | WEIGHT: 160 LBS | HEIGHT: 62 IN | HEART RATE: 84 BPM | SYSTOLIC BLOOD PRESSURE: 126 MMHG | TEMPERATURE: 97.2 F | DIASTOLIC BLOOD PRESSURE: 80 MMHG

## 2021-11-15 PROCEDURE — 93000 ELECTROCARDIOGRAM COMPLETE: CPT

## 2021-11-15 PROCEDURE — 99204 OFFICE O/P NEW MOD 45 MIN: CPT

## 2021-11-15 RX ORDER — ONDANSETRON 4 MG/1
4 TABLET, ORALLY DISINTEGRATING ORAL
Qty: 30 | Refills: 0 | Status: ACTIVE | COMMUNITY
Start: 2021-11-01

## 2021-11-15 RX ORDER — FAMOTIDINE 20 MG/1
20 TABLET, FILM COATED ORAL
Refills: 0 | Status: ACTIVE | COMMUNITY

## 2021-11-15 RX ORDER — PITAVASTATIN CALCIUM 2.09 MG/1
2 TABLET, FILM COATED ORAL DAILY
Refills: 0 | Status: ACTIVE | COMMUNITY

## 2021-11-15 RX ORDER — HYDROCHLOROTHIAZIDE 12.5 MG/1
12.5 TABLET ORAL
Qty: 90 | Refills: 0 | Status: ACTIVE | COMMUNITY
Start: 2021-09-28

## 2021-11-15 RX ORDER — OXYCODONE HYDROCHLORIDE 30 MG/1
30 TABLET, FILM COATED, EXTENDED RELEASE ORAL
Qty: 60 | Refills: 0 | Status: ACTIVE | COMMUNITY
Start: 2021-11-01

## 2021-11-15 RX ORDER — OXYCODONE HYDROCHLORIDE 15 MG/1
15 TABLET ORAL
Qty: 120 | Refills: 0 | Status: ACTIVE | COMMUNITY
Start: 2021-10-26

## 2021-11-15 RX ORDER — ESCITALOPRAM OXALATE 10 MG/1
10 TABLET ORAL
Qty: 90 | Refills: 0 | Status: ACTIVE | COMMUNITY
Start: 2021-10-12

## 2021-11-15 RX ORDER — IBUPROFEN 600 MG/1
600 TABLET, FILM COATED ORAL
Qty: 14 | Refills: 0 | Status: ACTIVE | COMMUNITY
Start: 2021-06-24

## 2021-11-15 NOTE — DISCUSSION/SUMMARY
[FreeTextEntry1] : ECHO to evaluate for MVP / structural heart disease.\par Will approach testing pragmatically given atypical symptoms / anxiety.\par Obtain EKG from PMD.\par Cont Livalo.\par Follow-up 3-months.

## 2021-11-15 NOTE — PHYSICAL EXAM
[de-identified] : well appearing, overweight, no distress [de-identified] : reg, nL s1/s2, no m/r/g [de-identified] : CTA [de-identified] : warm, no edema [de-identified] : alert, anxious, logical conversation

## 2021-11-15 NOTE — ASSESSMENT
[FreeTextEntry1] : Possible MVP.\par \par BP controlled.\par \par Atypical symptoms (not exertional).\par Anxiety possibly contributing.\par \par Tolerating statin.

## 2021-11-15 NOTE — HISTORY OF PRESENT ILLNESS
[FreeTextEntry1] : 71 year-old female presents for cardiac evaluation.\par \par Reported MVP (remote).\par \par Reportedly had abnormal EKG with PMD.  No specifics.  She sought cardiology evaluation on her own.\par \par Chest discomfort, dyspnea, and palpitations (distinct symptoms).  None exertional.  Random without clear precipitants.  Seconds to minutes.\par \par Walks without exertional symptoms.\par \par No syncope.\par \par COVID (6/2020).  Apparent mild to moderate symptoms.  Not vaccinated secondary to prior anaphylaxis.\par \par Admittedly, very anxious.  I also take care of 2 of her sisters who have significant anxiety.

## 2021-12-21 ENCOUNTER — APPOINTMENT (OUTPATIENT)
Dept: CARDIOLOGY | Facility: CLINIC | Age: 71
End: 2021-12-21
Payer: MEDICARE

## 2021-12-21 DIAGNOSIS — R06.00 DYSPNEA, UNSPECIFIED: ICD-10-CM

## 2021-12-21 PROCEDURE — 93306 TTE W/DOPPLER COMPLETE: CPT

## 2021-12-30 PROBLEM — R06.00 DYSPNEA: Status: ACTIVE | Noted: 2021-11-15

## 2022-01-05 NOTE — PATIENT PROFILE ADULT - IS THERE A SUSPICION OF ABUSE/NEGLIGENCE?
no

## 2022-01-24 ENCOUNTER — APPOINTMENT (OUTPATIENT)
Dept: CARDIOLOGY | Facility: CLINIC | Age: 72
End: 2022-01-24
Payer: MEDICARE

## 2022-01-24 VITALS
HEART RATE: 91 BPM | TEMPERATURE: 95.5 F | BODY MASS INDEX: 28.34 KG/M2 | SYSTOLIC BLOOD PRESSURE: 100 MMHG | DIASTOLIC BLOOD PRESSURE: 80 MMHG | HEIGHT: 62 IN | WEIGHT: 154 LBS

## 2022-01-24 DIAGNOSIS — I35.1 NONRHEUMATIC AORTIC (VALVE) INSUFFICIENCY: ICD-10-CM

## 2022-01-24 DIAGNOSIS — Z86.79 PERSONAL HISTORY OF OTHER DISEASES OF THE CIRCULATORY SYSTEM: ICD-10-CM

## 2022-01-24 PROCEDURE — 99213 OFFICE O/P EST LOW 20 MIN: CPT

## 2022-01-24 PROCEDURE — 93000 ELECTROCARDIOGRAM COMPLETE: CPT

## 2022-01-24 RX ORDER — PITAVASTATIN CALCIUM 2.09 MG/1
2 TABLET, FILM COATED ORAL
Qty: 90 | Refills: 0 | Status: DISCONTINUED | COMMUNITY
Start: 2021-10-30 | End: 2022-01-24

## 2022-01-24 RX ORDER — OXYCODONE HYDROCHLORIDE 20 MG/1
20 TABLET, FILM COATED, EXTENDED RELEASE ORAL
Qty: 60 | Refills: 0 | Status: DISCONTINUED | COMMUNITY
Start: 2021-09-27 | End: 2022-01-24

## 2022-01-24 NOTE — ASSESSMENT
[FreeTextEntry1] : BP controlled.\par \par Palpitations likely related to anxiety.\par \par Tolerating statin.\par \par ECHO (12/21/21): nL LVSF.  Border LVH.  Trace AI.

## 2022-01-24 NOTE — DISCUSSION/SUMMARY
[FreeTextEntry1] : Cont Livalo.\par Cont HCTZ.\par Regular PMD / specialist follow-up.\par Follow-up prn.\par \par MCOT offered (although suspicion for dangerous arrhythmia low).\par She does not want to wear at present.

## 2022-01-24 NOTE — REASON FOR VISIT
[FreeTextEntry1] : Stable.\par \par No interval cardiac symptoms.\par \par Persistent palpitations.  Few times / month.  No No lightheadedness / syncope.\par \par Chronic pain / RSD is biggest complaint.  Considering stimulator / morphine injector.  Also has regular psychiatry follow-up for anxiety / depression.\par \par ECHO (12/21/21): nL LVSF.  Border LVH.  Trace AI.

## 2022-01-24 NOTE — PHYSICAL EXAM
[de-identified] : well appearing, overweight, no distress [de-identified] : reg, nL s1/s2, no m/r/g [de-identified] : CTA [de-identified] : warm, no edema [de-identified] : alert, anxious, logical conversation

## 2022-06-01 ENCOUNTER — APPOINTMENT (OUTPATIENT)
Dept: GASTROENTEROLOGY | Facility: CLINIC | Age: 72
End: 2022-06-01
Payer: MEDICARE

## 2022-06-01 VITALS — BODY MASS INDEX: 27.97 KG/M2 | WEIGHT: 152 LBS | HEIGHT: 62 IN

## 2022-06-01 PROCEDURE — 99204 OFFICE O/P NEW MOD 45 MIN: CPT

## 2022-06-01 NOTE — ASSESSMENT
[FreeTextEntry1] : Patient is a 72 y/o female with multiple comorbidities, chronic pain, osteosarcoma, whom was referred by Dr. Buckner as found o have an Atrophic pancreas, along with CBD and PD dilation. Patient has normal LFT. \par \par Atrophic Pancreas/ Ductal dialtion\par - Tumor markers and LFT normal\par - Calprotectin and elastase ordered\par - Setup for non urgent EUS

## 2022-06-01 NOTE — REASON FOR VISIT
[Consultation] : a consultation visit [Family Member] : family member [FreeTextEntry1] : Np - Ref by Dr. Sita Nugent Abd Pain

## 2022-06-01 NOTE — HISTORY OF PRESENT ILLNESS
[de-identified] : Patient is a 70 y/o female with multiple comorbidities, chronic pain, osteosarcoma, whom was referred by Dr. Buckner as found o have an Atrophic pancreas, along with CBD and PD dilation. Patient has normal LFT.

## 2022-06-01 NOTE — PHYSICAL EXAM
[General Appearance - Alert] : alert [General Appearance - In No Acute Distress] : in no acute distress [Sclera] : the sclera and conjunctiva were normal [Outer Ear] : the ears and nose were normal in appearance [Neck Appearance] : the appearance of the neck was normal [Exaggerated Use Of Accessory Muscles For Inspiration] : no accessory muscle use [Heart Sounds] : normal S1 and S2 [Abdomen Tenderness] : non-tender [No CVA Tenderness] : no ~M costovertebral angle tenderness [Abnormal Walk] : normal gait [Skin Color & Pigmentation] : normal skin color and pigmentation [] : no rash [No Focal Deficits] : no focal deficits [Oriented To Time, Place, And Person] : oriented to person, place, and time

## 2022-07-14 ENCOUNTER — APPOINTMENT (OUTPATIENT)
Dept: NEUROLOGY | Facility: CLINIC | Age: 72
End: 2022-07-14

## 2022-07-19 ENCOUNTER — LABORATORY RESULT (OUTPATIENT)
Age: 72
End: 2022-07-19

## 2022-07-22 ENCOUNTER — OUTPATIENT (OUTPATIENT)
Dept: OUTPATIENT SERVICES | Facility: HOSPITAL | Age: 72
LOS: 1 days | Discharge: HOME | End: 2022-07-22

## 2022-07-22 ENCOUNTER — RESULT REVIEW (OUTPATIENT)
Age: 72
End: 2022-07-22

## 2022-07-22 ENCOUNTER — TRANSCRIPTION ENCOUNTER (OUTPATIENT)
Age: 72
End: 2022-07-22

## 2022-07-22 VITALS
HEIGHT: 62.5 IN | TEMPERATURE: 97 F | DIASTOLIC BLOOD PRESSURE: 102 MMHG | HEART RATE: 94 BPM | RESPIRATION RATE: 18 BRPM | SYSTOLIC BLOOD PRESSURE: 169 MMHG | WEIGHT: 154.98 LBS

## 2022-07-22 VITALS
HEART RATE: 73 BPM | SYSTOLIC BLOOD PRESSURE: 170 MMHG | DIASTOLIC BLOOD PRESSURE: 67 MMHG | TEMPERATURE: 98 F | OXYGEN SATURATION: 97 % | RESPIRATION RATE: 20 BRPM

## 2022-07-22 DIAGNOSIS — Z98.890 OTHER SPECIFIED POSTPROCEDURAL STATES: Chronic | ICD-10-CM

## 2022-07-22 PROCEDURE — 45390 COLONOSCOPY W/RESECTION: CPT

## 2022-07-22 PROCEDURE — 45381 COLONOSCOPY SUBMUCOUS NJX: CPT | Mod: XU

## 2022-07-22 PROCEDURE — 43239 EGD BIOPSY SINGLE/MULTIPLE: CPT | Mod: XS

## 2022-07-22 PROCEDURE — 88312 SPECIAL STAINS GROUP 1: CPT | Mod: 26

## 2022-07-22 PROCEDURE — 88305 TISSUE EXAM BY PATHOLOGIST: CPT | Mod: 26

## 2022-07-22 RX ORDER — OXYCODONE HYDROCHLORIDE 5 MG/1
1 TABLET ORAL
Qty: 0 | Refills: 0 | DISCHARGE

## 2022-07-22 RX ORDER — MECLIZINE HCL 12.5 MG
1 TABLET ORAL
Qty: 0 | Refills: 0 | DISCHARGE

## 2022-07-22 RX ORDER — PITAVASTATIN CALCIUM 1.04 MG/1
1 TABLET, FILM COATED ORAL
Qty: 0 | Refills: 0 | DISCHARGE

## 2022-07-22 RX ORDER — CLONAZEPAM 1 MG
1 TABLET ORAL
Qty: 0 | Refills: 0 | DISCHARGE

## 2022-07-22 RX ORDER — PANTOPRAZOLE SODIUM 20 MG/1
1 TABLET, DELAYED RELEASE ORAL
Qty: 30 | Refills: 2
Start: 2022-07-22

## 2022-07-22 RX ORDER — ESCITALOPRAM OXALATE 10 MG/1
1 TABLET, FILM COATED ORAL
Qty: 0 | Refills: 0 | DISCHARGE

## 2022-07-22 NOTE — ASU PATIENT PROFILE, ADULT - FALL HARM RISK - UNIVERSAL INTERVENTIONS
Bed in lowest position, wheels locked, appropriate side rails in place/Call bell, personal items and telephone in reach/Instruct patient to call for assistance before getting out of bed or chair/Non-slip footwear when patient is out of bed/[place] to call system/Physically safe environment - no spills, clutter or unnecessary equipment/Purposeful Proactive Rounding/Room/bathroom lighting operational, light cord in reach

## 2022-07-22 NOTE — ASU DISCHARGE PLAN (ADULT/PEDIATRIC) - NS MD DC FALL RISK RISK
For information on Fall & Injury Prevention, visit: [email or web address] OR  [email or web address] OR  https://www.cdc.gov/steadi/patient.html

## 2022-07-22 NOTE — H&P ADULT - HISTORY OF PRESENT ILLNESS
Patient presents today for Endoscopic evaluation with possible intervention.  Patient presents today for Endoscopic evaluation with possible intervention.   Patient is a 72 y/o female with multiple comorbidities, chronic pain, osteosarcoma, whom was referred by Dr. Buckner as found o have an Atrophic pancreas, along with CBD and PD dilation. Patient has normal LFT.

## 2022-07-22 NOTE — CHART NOTE - NSCHARTNOTEFT_GEN_A_CORE
PACU ANESTHESIA ADMISSION NOTE      Procedure: egd w/eus  Post op diagnosis:      ____  Intubated  TV:______       Rate: ______      FiO2: ______    _x___  Patent Airway    _x___  Full return of protective reflexes    _x___  Full recovery from anesthesia / back to baseline status    Vitals:  T(C): na  HR: 57  BP: 100/58  RR: 14  SpO2: 98    Mental Status:  ___ Awake   _____ Alert   ___x__ Drowsy   _____ Sedated    Nausea/Vomiting:  _x___  NO       ______Yes,   See Post - Op Orders         Pain Scale (0-10):  __0___    Treatment: _x___ None    ____ See Post - Op/PCA Orders    Post - Operative Fluids:   __x__ Oral   ____ See Post - Op Orders    Plan: Discharge:   _x___Home       _____Floor     _____Critical Care    _____  Other:_________________    Comments:  No anesthesia issues or complications noted.  Discharge when criteria met.

## 2022-07-22 NOTE — ASU PREOP CHECKLIST - ASSESSMENT, HISTORY & PHYSICAL COMPLETED AND ON MEDICAL RECORD
Pili called with concerns about meningitis exposure while being pregnant.  Please advise.  Pili can be reached at [phone].   done

## 2022-07-22 NOTE — H&P ADULT - NSICDXPASTMEDICALHX_GEN_ALL_CORE_FT
PAST MEDICAL HISTORY:  Anxiety     Basal cell carcinoma     High blood cholesterol     Mitral valve regurgitation     Parotid gland adenocarcinoma     Sarcoma of bone of foot, right

## 2022-07-25 PROBLEM — C44.91 BASAL CELL CARCINOMA OF SKIN, UNSPECIFIED: Chronic | Status: ACTIVE | Noted: 2022-07-22

## 2022-07-25 LAB — SURGICAL PATHOLOGY STUDY: SIGNIFICANT CHANGE UP

## 2022-07-27 ENCOUNTER — APPOINTMENT (OUTPATIENT)
Dept: GASTROENTEROLOGY | Facility: CLINIC | Age: 72
End: 2022-07-27

## 2022-07-27 PROCEDURE — 99442: CPT | Mod: 95

## 2022-07-27 NOTE — HISTORY OF PRESENT ILLNESS
[Home] : at home, [unfilled] , at the time of the visit. [Medical Office: ([facility])___] : at the medical office located in  [Verbal consent obtained from patient] : the patient, [unfilled] [___ Month(s) Ago] : [unfilled] month(s) ago [Test: ___] : [unfilled] [_________] : Performed [unfilled] [FreeTextEntry4] : [place] [de-identified] : 6/1/22 [de-identified] : Patient is a 70 y/o female with multiple comorbidities, chronic pain, osteosarcoma, whom was referred by Dr. Buckner as found o have an Atrophic pancreas, along with CBD and PD dilation. Patient has normal LFT. Low Elastase. EUS done and without a mass. Given Movantik but didn’t start it yet.

## 2022-07-27 NOTE — ASSESSMENT
[FreeTextEntry1] : Patient is a 70 y/o female with multiple comorbidities, chronic pain, osteosarcoma, whom was referred by Dr. Buckner as found o have an Atrophic pancreas, along with CBD and PD dilation. Patient has normal LFT. Low Elastase. EUS done and without a mass. Given Movantik but didn't’t start it yet. \par \par \par Atrophic Pancreas/ Ductal dialtion\par - Tumor markers and LFT normal\par - Calprotectin and elastase - Elastase low at 149\par - EUS reassuring , no mass\par - Creon Given 24,000 take two 4 times daily \par \par Opioid induced Constipation\par - Encouraged to try Movantik\par - Needs to come off of Opioids- chronic use\par \par Follow up in 6 months \par Send copy of reports to Dr. Buckner

## 2022-07-28 DIAGNOSIS — K29.50 UNSPECIFIED CHRONIC GASTRITIS WITHOUT BLEEDING: ICD-10-CM

## 2022-07-28 DIAGNOSIS — K20.90 ESOPHAGITIS, UNSPECIFIED WITHOUT BLEEDING: ICD-10-CM

## 2022-07-28 DIAGNOSIS — K83.8 OTHER SPECIFIED DISEASES OF BILIARY TRACT: ICD-10-CM

## 2022-07-28 DIAGNOSIS — Z85.830 PERSONAL HISTORY OF MALIGNANT NEOPLASM OF BONE: ICD-10-CM

## 2022-07-28 DIAGNOSIS — I34.0 NONRHEUMATIC MITRAL (VALVE) INSUFFICIENCY: ICD-10-CM

## 2022-07-28 DIAGNOSIS — E78.00 PURE HYPERCHOLESTEROLEMIA, UNSPECIFIED: ICD-10-CM

## 2022-07-28 DIAGNOSIS — Z85.828 PERSONAL HISTORY OF OTHER MALIGNANT NEOPLASM OF SKIN: ICD-10-CM

## 2022-07-28 DIAGNOSIS — Z85.858 PERSONAL HISTORY OF MALIGNANT NEOPLASM OF OTHER ENDOCRINE GLANDS: ICD-10-CM

## 2022-07-28 DIAGNOSIS — Z91.041 RADIOGRAPHIC DYE ALLERGY STATUS: ICD-10-CM

## 2022-08-03 ENCOUNTER — APPOINTMENT (OUTPATIENT)
Dept: GASTROENTEROLOGY | Facility: CLINIC | Age: 72
End: 2022-08-03

## 2022-08-10 ENCOUNTER — APPOINTMENT (OUTPATIENT)
Dept: GASTROENTEROLOGY | Facility: CLINIC | Age: 72
End: 2022-08-10

## 2022-08-10 ENCOUNTER — TRANSCRIPTION ENCOUNTER (OUTPATIENT)
Age: 72
End: 2022-08-10

## 2022-08-11 ENCOUNTER — APPOINTMENT (OUTPATIENT)
Dept: OTOLARYNGOLOGY | Facility: CLINIC | Age: 72
End: 2022-08-11

## 2022-10-17 LAB
CALPROTECTIN FECAL: 21 UG/G
PANCREATIC ELASTASE, FECAL: 149

## 2023-02-22 ENCOUNTER — APPOINTMENT (OUTPATIENT)
Dept: GASTROENTEROLOGY | Facility: CLINIC | Age: 73
End: 2023-02-22
Payer: MEDICARE

## 2023-02-22 DIAGNOSIS — Z80.7 FAMILY HISTORY OF OTHER MALIGNANT NEOPLASMS OF LYMPHOID, HEMATOPOIETIC AND RELATED TISSUES: ICD-10-CM

## 2023-02-22 DIAGNOSIS — G90.50 COMPLEX REGIONAL PAIN SYNDROME I, UNSPECIFIED: ICD-10-CM

## 2023-02-22 DIAGNOSIS — Z80.3 FAMILY HISTORY OF MALIGNANT NEOPLASM OF BREAST: ICD-10-CM

## 2023-02-22 DIAGNOSIS — Z86.39 PERSONAL HISTORY OF OTHER ENDOCRINE, NUTRITIONAL AND METABOLIC DISEASE: ICD-10-CM

## 2023-02-22 DIAGNOSIS — Z80.8 FAMILY HISTORY OF MALIGNANT NEOPLASM OF OTHER ORGANS OR SYSTEMS: ICD-10-CM

## 2023-02-22 DIAGNOSIS — R10.9 UNSPECIFIED ABDOMINAL PAIN: ICD-10-CM

## 2023-02-22 DIAGNOSIS — Z86.79 PERSONAL HISTORY OF OTHER DISEASES OF THE CIRCULATORY SYSTEM: ICD-10-CM

## 2023-02-22 PROCEDURE — 99443: CPT | Mod: 95

## 2023-02-23 NOTE — ASSESSMENT
[FreeTextEntry1] : 72 yr old female with H/O Atrophic Pancreas with dilated CBD and PD, Chronic Constipation due to chronic opioid use for 6 month F/U. She continues on opioids for chronic pain prescribed by a Pain MD at [facility]. She stated that she has not taken the Movantik because she has not been constipated until about a week ago. She took Lactulose with good response. She reported occasional abdominal pain and bloating but she ha not been taking Creon. She denied vomiting, fevers, jaundice, melena, heartburn, dysphagia, blood in the stools, or weight loss. \par \par \par Atrophic Pancreas/Dilated PD and CBD\par \par - Told patient that she needs to take the Creon regularly as prescribed and explained to her why she need to take it; explained to her that she should take one with a small meal or snack and 2 with a regular meal\par - Will repeat labs with pancreatic tumor markers in 6 months\par - EUS from 7/22 reviewed and showed no masses or lesions\par - F/U in 6 months\par \par Opioid induced  Constipation\par \par - She is refusing to take the Movantik and wants to continue the Lactulose prn \par - She also stated that she really needs the narcotics for her chronic pain and will not be able to come off of them.\par - High Fiber diet with adequate water intake recommended\par  \par

## 2023-02-23 NOTE — HISTORY OF PRESENT ILLNESS
[Home] : at home, [unfilled] , at the time of the visit. [Medical Office: ([facility])___] : at the medical office located in  [Verbal consent obtained from patient] : the patient, [unfilled] [FreeTextEntry4] : [place] [FreeTextEntry1] : 3/2021

## 2023-02-23 NOTE — REVIEW OF SYSTEMS
[As Noted in HPI] : as noted in HPI [Abdominal Pain] : abdominal pain [Constipation] : constipation [Bloating (gassiness)] : bloating [Negative] : Heme/Lymph [Vomiting] : no vomiting [Diarrhea] : no diarrhea [Heartburn] : no heartburn [Melena (black stool)] : no melena [Bleeding] : no bleeding [Fecal Incontinence (soiling)] : no fecal incontinence

## 2023-07-11 NOTE — ED PROVIDER NOTE - RECEIVING PHYSICIAN
Problem: BIRTH - VAGINAL/ SECTION  Goal: Fetal and maternal status remain reassuring during the birth process  Description: INTERVENTIONS:  - Monitor vital signs  - Monitor fetal heart rate  - Monitor uterine activity  - Monitor labor progression (vaginal delivery)  - DVT prophylaxis (C/S delivery)  - Surgical antibiotic prophylaxis (C/S delivery)  Outcome: Progressing     Problem: PAIN - ADULT  Goal: Verbalizes/displays adequate comfort level or patient's stated pain goal  Description: INTERVENTIONS:  - Encourage pt to monitor pain and request assistance  - Assess pain using appropriate pain scale  - Administer analgesics based on type and severity of pain and evaluate response  - Implement non-pharmacological measures as appropriate and evaluate response  - Consider cultural and social influences on pain and pain management  - Manage/alleviate anxiety  - Utilize distraction and/or relaxation techniques  - Monitor for opioid side effects  - Notify MD/LIP if interventions unsuccessful or patient reports new pain  - Anticipate increased pain with activity and pre-medicate as appropriate  Outcome: Progressing     Problem: ANXIETY  Goal: Will report anxiety at manageable levels  Description: INTERVENTIONS:  - Administer medication as ordered  - Teach and rehearse alternative coping skills  - Provide emotional support with 1:1 interaction with staff  Outcome: Progressing     Problem: Patient/Family Goals  Goal: Patient/Family Long Term Goal  Description: Patient's Long Term Goal: uncomplicated vaginal delivery    Interventions:  -communicate POC  - See additional Care Plan goals for specific interventions  Outcome: Progressing  Goal: Patient/Family Short Term Goal  Description: Patient's Short Term Goal: Pain control    Interventions:   -communicate POC  - See additional Care Plan goals for specific interventions  Outcome: Progressing     Problem: SAFETY ADULT - FALL  Goal: Free from fall injury  Description: INTERVENTIONS:  - Assess pt frequently for physical needs  - Identify cognitive and physical deficits and behaviors that affect risk of falls.   - [place] fall precautions as indicated by assessment.  - Educate pt/family on patient safety including physical limitations  - Instruct pt to call for assistance with activity based on assessment  - Modify environment to reduce risk of injury  - Provide assistive devices as appropriate  - Consider OT/PT consult to assist with strengthening/mobility  - Encourage toileting schedule  Outcome: Progressing Dr. Santos

## 2023-08-23 ENCOUNTER — APPOINTMENT (OUTPATIENT)
Dept: GASTROENTEROLOGY | Facility: CLINIC | Age: 73
End: 2023-08-23
Payer: MEDICARE

## 2023-08-23 VITALS
WEIGHT: 157.8 LBS | DIASTOLIC BLOOD PRESSURE: 89 MMHG | BODY MASS INDEX: 29.04 KG/M2 | HEART RATE: 97 BPM | HEIGHT: 62 IN | SYSTOLIC BLOOD PRESSURE: 137 MMHG | OXYGEN SATURATION: 98 %

## 2023-08-23 DIAGNOSIS — Z78.9 OTHER SPECIFIED HEALTH STATUS: ICD-10-CM

## 2023-08-23 DIAGNOSIS — Z85.3 PERSONAL HISTORY OF MALIGNANT NEOPLASM OF BREAST: ICD-10-CM

## 2023-08-23 PROCEDURE — 99213 OFFICE O/P EST LOW 20 MIN: CPT

## 2023-08-23 RX ORDER — PANCRELIPASE 120000; 24000; 76000 [USP'U]/1; [USP'U]/1; [USP'U]/1
24000-76000 CAPSULE, DELAYED RELEASE PELLETS ORAL 4 TIMES DAILY
Qty: 240 | Refills: 3 | Status: DISCONTINUED | COMMUNITY
Start: 2022-06-01 | End: 2023-08-23

## 2023-08-23 RX ORDER — FEXOFENADINE HYDROCHLORIDE 60 MG/1
60 TABLET, FILM COATED ORAL DAILY
Refills: 0 | Status: DISCONTINUED | COMMUNITY
End: 2023-08-23

## 2023-08-23 RX ORDER — NALOXEGOL OXALATE 12.5 MG/1
12.5 TABLET, FILM COATED ORAL
Qty: 30 | Refills: 3 | Status: DISCONTINUED | COMMUNITY
Start: 2022-06-01 | End: 2023-08-23

## 2023-08-24 RX ORDER — PANCRELIPASE 36000; 180000; 114000 [USP'U]/1; [USP'U]/1; [USP'U]/1
36000-114000 CAPSULE, DELAYED RELEASE PELLETS ORAL
Qty: 630 | Refills: 3 | Status: ACTIVE | COMMUNITY
Start: 2023-08-24 | End: 1900-01-01

## 2023-08-24 RX ORDER — CHROMIUM 200 MCG
TABLET ORAL
Refills: 0 | Status: ACTIVE | COMMUNITY

## 2023-08-29 NOTE — REVIEW OF SYSTEMS
[Abdominal Pain] : abdominal pain [Constipation] : constipation [Negative] : Heme/Lymph [Bloating (gassiness)] : bloating [Vomiting] : no vomiting [Diarrhea] : no diarrhea [Heartburn] : no heartburn [Melena (black stool)] : no melena [Bleeding] : no bleeding [Fecal Incontinence (soiling)] : no fecal incontinence

## 2023-08-29 NOTE — ASSESSMENT
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 She continues on opioids for chronic pain prescribed by a Pain MD at [facility]. She stated that she has not taken the Movantik because she has not been constipated until about a week ago. She took Lactulose with good response. She reported occasional abdominal pain and bloating but she has not been taking Creon. EUS done 7/22 was negative for masses or lesions in the pancreas. She denied vomiting, fevers, jaundice, melena, heartburn, dysphagia, blood in the stools, or weight loss. She never started taking the Creon because she was concerned about SE. She gets occasional cramping that is worse with increased constipation. She still is refusing to take the Movantik and prefers to take the Lactulose prn which she stated works well.     Atrophic Pancreas/Dilated PD and CBD - Told patient that she needs to take the Creon regularly as prescribed and explained to her why she need to take it; explained to her that she should take one with a small meal or snack and 2 with a regular meal - Will repeat labs with pancreatic tumor markers - EUS from 7/22 reviewed and showed no masses or lesions - Repeat MRI of Abdomen with IV Contrast - F/U in 6 months  Opioid induced Constipation - She is refusing to take the Movantik and wants to continue the Lactulose prn  - She also stated that she really needs the narcotics for her chronic pain and will not be able to come off of them. - High Fiber diet with adequate water intake recommended

## 2023-08-29 NOTE — HISTORY OF PRESENT ILLNESS
Body Location Override (Optional): right lateral shoulder
Detail Level: Detailed
Add 40751 Cpt? (Important Note: In 2017 The Use Of 00180 Is Being Tracked By Cms To Determine Future Global Period Reimbursement For Global Periods): yes
[FreeTextEntry1] : 72 yr old female with H/O Atrophic Pancreas with dilated CBD and PD, Chronic Constipation due to chronic opioid use for 6 month F/U. She continues on opioids for chronic pain prescribed by a Pain MD at [facility]. She refused to take the Movantik in the past. She took Lactulose with good response. She reported occasional abdominal pain and bloating but she has not been taking Creon. EUS done 7/22 was negative for masses or lesions in the pancreas. She denied vomiting, fevers, jaundice, melena, heartburn, dysphagia, blood in the stools, or weight loss. She never started taking the Creon because she was concerned about SE. She gets occasional cramping that is worse with increased constipation. 
Wound Diameter In Cm(Optional): 0
Wound Crusting?: clean
Sutures?: intact
Wound Edema?: mild
Wound Color?: pink
Patient To Follow-Up With?: their primary dermatologist

## 2023-08-30 LAB
ALBUMIN SERPL ELPH-MCNC: 4.7 G/DL
ALP BLD-CCNC: 74 U/L
ALT SERPL-CCNC: 17 U/L
ANION GAP SERPL CALC-SCNC: 13 MMOL/L
AST SERPL-CCNC: 20 U/L
BILIRUB SERPL-MCNC: 0.4 MG/DL
BUN SERPL-MCNC: 18 MG/DL
CALCIUM SERPL-MCNC: 9.7 MG/DL
CANCER AG19-9 SERPL-ACNC: 3 U/ML
CEA SERPL-MCNC: 1.3 NG/ML
CHLORIDE SERPL-SCNC: 99 MMOL/L
CO2 SERPL-SCNC: 29 MMOL/L
CREAT SERPL-MCNC: 1.1 MG/DL
EGFR: 54 ML/MIN/1.73M2
GLUCOSE SERPL-MCNC: 135 MG/DL
POTASSIUM SERPL-SCNC: 4.9 MMOL/L
PROT SERPL-MCNC: 6.9 G/DL
SODIUM SERPL-SCNC: 141 MMOL/L

## 2023-09-08 ENCOUNTER — APPOINTMENT (OUTPATIENT)
Dept: CARDIOLOGY | Facility: CLINIC | Age: 73
End: 2023-09-08
Payer: MEDICARE

## 2023-09-08 VITALS
HEART RATE: 78 BPM | WEIGHT: 158 LBS | BODY MASS INDEX: 29.08 KG/M2 | SYSTOLIC BLOOD PRESSURE: 120 MMHG | HEIGHT: 62 IN | DIASTOLIC BLOOD PRESSURE: 82 MMHG

## 2023-09-08 PROCEDURE — 99214 OFFICE O/P EST MOD 30 MIN: CPT

## 2023-09-08 PROCEDURE — 93000 ELECTROCARDIOGRAM COMPLETE: CPT

## 2023-09-08 NOTE — DISCUSSION/SUMMARY
[FreeTextEntry1] : Cont Livalo. Cont HCTZ. MCOT to define palpitations Labs Follow-up 6-months [EKG obtained to assist in diagnosis and management of assessed problem(s)] : EKG obtained to assist in diagnosis and management of assessed problem(s)

## 2023-09-08 NOTE — REASON FOR VISIT
[FreeTextEntry1] : Stable.  RSD remains dominant issue.  Persistent palpitations.  Now willing to wear a monitor.

## 2023-09-08 NOTE — ASSESSMENT
[FreeTextEntry1] : BP controlled.  Palpitations likely related to anxiety.  Tolerating statin.  ECHO (12/21/21): nL LVSF.  Border LVH.  Trace AI.

## 2023-09-08 NOTE — PHYSICAL EXAM
[de-identified] : well appearing, overweight, no distress [de-identified] : reg, nL s1/s2, no m/r/g [de-identified] : warm, no edema [de-identified] : CTA [de-identified] : alert, anxious, logical conversation

## 2023-09-08 NOTE — HISTORY OF PRESENT ILLNESS
[FreeTextEntry1] : 71 year-old female presents for cardiac evaluation.  Reported MVP (remote).  Reportedly had abnormal EKG with PMD.  No specifics.  She sought cardiology evaluation on her own.  Chest discomfort, dyspnea, and palpitations (distinct symptoms).  None exertional.  Random without clear precipitants.  Seconds to minutes.  Walks without exertional symptoms.  No syncope.  COVID (6/2020).  Apparent mild to moderate symptoms.  Not vaccinated secondary to prior anaphylaxis.  Admittedly, very anxious.  I also take care of 2 of her sisters who have significant anxiety.

## 2023-09-15 ENCOUNTER — OUTPATIENT (OUTPATIENT)
Dept: OUTPATIENT SERVICES | Facility: HOSPITAL | Age: 73
LOS: 1 days | End: 2023-09-15

## 2023-09-15 DIAGNOSIS — Z98.890 OTHER SPECIFIED POSTPROCEDURAL STATES: Chronic | ICD-10-CM

## 2023-09-15 DIAGNOSIS — E78.5 HYPERLIPIDEMIA, UNSPECIFIED: ICD-10-CM

## 2023-09-15 DIAGNOSIS — I10 ESSENTIAL (PRIMARY) HYPERTENSION: ICD-10-CM

## 2023-09-16 DIAGNOSIS — I10 ESSENTIAL (PRIMARY) HYPERTENSION: ICD-10-CM

## 2023-09-16 DIAGNOSIS — E78.5 HYPERLIPIDEMIA, UNSPECIFIED: ICD-10-CM

## 2023-09-26 ENCOUNTER — NON-APPOINTMENT (OUTPATIENT)
Age: 73
End: 2023-09-26

## 2023-10-16 ENCOUNTER — NON-APPOINTMENT (OUTPATIENT)
Age: 73
End: 2023-10-16

## 2024-02-21 ENCOUNTER — APPOINTMENT (OUTPATIENT)
Dept: GASTROENTEROLOGY | Facility: CLINIC | Age: 74
End: 2024-02-21
Payer: MEDICARE

## 2024-02-21 VITALS — BODY MASS INDEX: 29.85 KG/M2 | WEIGHT: 162.2 LBS | HEIGHT: 62 IN

## 2024-02-21 DIAGNOSIS — Z78.9 OTHER SPECIFIED HEALTH STATUS: ICD-10-CM

## 2024-02-21 DIAGNOSIS — K86.89 OTHER SPECIFIED DISEASES OF PANCREAS: ICD-10-CM

## 2024-02-21 DIAGNOSIS — K83.8 OTHER SPECIFIED DISEASES OF BILIARY TRACT: ICD-10-CM

## 2024-02-21 DIAGNOSIS — T40.2X5A DRUG INDUCED CONSTIPATION: ICD-10-CM

## 2024-02-21 DIAGNOSIS — R10.9 UNSPECIFIED ABDOMINAL PAIN: ICD-10-CM

## 2024-02-21 DIAGNOSIS — K59.03 DRUG INDUCED CONSTIPATION: ICD-10-CM

## 2024-02-21 PROCEDURE — 99213 OFFICE O/P EST LOW 20 MIN: CPT

## 2024-02-21 RX ORDER — NALOXONE HYDROCHLORIDE NASAL 4 MG/.1ML
4 SPRAY NASAL
Qty: 2 | Refills: 0 | Status: DISCONTINUED | COMMUNITY
Start: 2021-10-26 | End: 2024-02-21

## 2024-02-21 RX ORDER — CLONAZEPAM 1 MG/1
1 TABLET ORAL
Qty: 60 | Refills: 0 | Status: DISCONTINUED | COMMUNITY
Start: 2021-10-01 | End: 2024-02-21

## 2024-02-21 NOTE — PHYSICAL EXAM
[Alert] : alert [Normal Voice/Communication] : normal voice/communication [Healthy Appearing] : healthy appearing [No Acute Distress] : no acute distress [Hearing Threshold Finger Rub Not [place]] : hearing was normal [Sclera] : the sclera and conjunctiva were normal [Normal Lips/Gums] : the lips and gums were normal [Oropharynx] : the oropharynx was normal [Normal Appearance] : the appearance of the neck was normal [No Neck Mass] : no neck mass was observed [No Respiratory Distress] : no respiratory distress [No Acc Muscle Use] : no accessory muscle use [Respiration, Rhythm And Depth] : normal respiratory rhythm and effort [Auscultation Breath Sounds / Voice Sounds] : lungs were clear to auscultation bilaterally [Heart Rate And Rhythm] : heart rate was normal and rhythm regular [Normal S1, S2] : normal S1 and S2 [Murmurs] : no murmurs [Bowel Sounds] : normal bowel sounds [Abdomen Tenderness] : non-tender [No Masses] : no abdominal mass palpated [Abdomen Soft] : soft [] : no hepatosplenomegaly [Oriented To Time, Place, And Person] : oriented to person, place, and time

## 2024-02-21 NOTE — ASSESSMENT
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 She continues on opioids for chronic pain prescribed by a Pain MD at [facility]. She stated that she has not taken the Movantik because she has not been constipated until about a week ago. She took Lactulose with good response. She reported occasional abdominal pain and bloating but she has not been taking Creon. EUS done 7/22 was negative for masses or lesions in the pancreas. She denied vomiting, fevers, jaundice, melena, heartburn, dysphagia, blood in the stools, or weight loss. She never started taking the Creon because she was concerned about SE. She gets occasional cramping that is worse with increased constipation. She still is refusing to take the Movantik and prefers to take the Lactulose prn which she stated works well.   Atrophic Pancreas/Dilated PD and CBD - Told patient that she needs to take the Creon regularly as prescribed and explained to her why she need to take it; explained to her that she should take one with a small meal or snack and 2 with a regular meal- declines to take for now - MRI reviewed and without changes of the Pancreas- Referred to Dr Hood for Renal Cyst - EUS from 7/22 reviewed and showed no masses or lesions - Copy of MRI

## 2024-03-08 ENCOUNTER — APPOINTMENT (OUTPATIENT)
Dept: CARDIOLOGY | Facility: CLINIC | Age: 74
End: 2024-03-08

## 2024-03-18 NOTE — ED ADULT NURSE NOTE - NS ED NURSE RECORD ANOTHER HT AND WT
Chief Complaint  Annual Exam    Subjective      History of Present Illness    Matt Albarran is a 38 y.o. male who presents to [facility]     Last labs: 6/2023  PSA: Denies family history of prostate cancer  Colonoscopy: Denies family history of colon cancer  Influenza vaccination: Declines   COVID19 vaccination: Declines  Eye exam: 2024, [facility] in [place]   Dental exam: Due   Smoking history: Denies   Specialists: Cardiology      Annual physical exam-  Patient denies significant family history of cardiovascular events, arrhythmias. Denies chest pain, blurry vision, headache, leg swelling, shortness of breath, seizure activity.     DM2-  Most recent A1c 6.4. Managed with metformin and Jardiance. He does not check his blood glucose levels at home. Diabetic eye exam: Up to date. Diabetic foot exam: Due. Urine microalbumin: Due.  Renal protection: ACE. Pneumonia vaccination: Agreeable.     Patient evaluated for palpitations. Recently established with cardiology for evaluation. Scheduled for echo, holter and stress test. He is scheduled for these early next month. He was referred to sleep medicine for a sleep study and was told that they would try to get him set up for an in home sleep study. Patient states that he does not snore much. He continues to have the palpitations, these have improved but are persistent. States initially these were constant and took his breath away, took about a week for him to notice improvement. Mild blood pressure elevation today, he has not had his medication since 2 PM yesterday. He does not check his blood pressure routinely at home but states it is typically in the 110s to low 130s/70s. Denies chest pain, blurry vision, headache, leg swelling. Admits to being more fatigued, he thinks this is probably related to his job but is wondering about checking his testosterone levels.     HLD-  Managed with statin. Well tolerated, denies leg  "cramping. Most recent LDL 69.     Objective   Vital Signs:   Vitals:    03/18/24 0758   BP: 132/84   BP Location: Right arm   Patient Position: Sitting   Cuff Size: Large Adult   Pulse: 74   Temp: 96.5 °F (35.8 °C)   TempSrc: Temporal   SpO2: 98%   Weight: 111 kg (245 lb 12.8 oz)   Height: 182.9 cm (72.01\")     Body mass index is 33.33 kg/m².    Wt Readings from Last 3 Encounters:   03/18/24 111 kg (245 lb 12.8 oz)   03/04/24 112 kg (246 lb 11.2 oz)   02/21/24 114 kg (251 lb 3.2 oz)     BP Readings from Last 3 Encounters:   03/18/24 132/84   03/04/24 133/78   02/21/24 102/70       Health Maintenance   Topic Date Due    HEPATITIS C SCREENING  Never done    ANNUAL PHYSICAL  Never done    DIABETIC EYE EXAM  Never done    HEMOGLOBIN A1C  12/22/2023    URINE MICROALBUMIN  01/10/2024    COVID-19 Vaccine (1 - 2023-24 season) 03/20/2024 (Originally 9/1/2023)    INFLUENZA VACCINE  03/31/2024 (Originally 8/1/2023)    Hepatitis B (1 of 3 - 19+ 3-dose series) 03/18/2025 (Originally 11/5/2006)    LIPID PANEL  06/22/2024    BMI FOLLOWUP  06/22/2024    DIABETIC FOOT EXAM  03/18/2025    TDAP/TD VACCINES (4 - Td or Tdap) 09/16/2032    Pneumococcal Vaccine 0-64  Completed       Physical Exam  Constitutional:       Appearance: Normal appearance.   HENT:      Head: Normocephalic and atraumatic.      Nose: Nose normal.      Mouth/Throat:      Mouth: Mucous membranes are moist.      Pharynx: Oropharynx is clear.   Eyes:      Extraocular Movements: Extraocular movements intact.      Conjunctiva/sclera: Conjunctivae normal.      Pupils: Pupils are equal, round, and reactive to light.   Neck:      Thyroid: No thyroid mass, thyromegaly or thyroid tenderness.   Cardiovascular:      Rate and Rhythm: Normal rate and regular rhythm.      Heart sounds: Normal heart sounds.   Pulmonary:      Effort: Pulmonary effort is normal.      Breath sounds: Normal breath sounds.   Musculoskeletal:        Feet:    Skin:     General: Skin is warm and dry. "   Neurological:      General: No focal deficit present.      Mental Status: He is alert and oriented to person, place, and time.   Psychiatric:         Mood and Affect: Mood normal.         Behavior: Behavior normal.         Thought Content: Thought content normal.          Result Review :  The following data was reviewed by: ELIO Berger on 03/18/2024:         Procedures          Assessment & Plan  Annual physical exam  Basic labs in clinic today. Encouraged routine dental and eye exams. Discussed age appropriate immunizations, screenings. Age appropriate handout provided, including information on nutrition and physical activity.  Type 2 diabetes mellitus with hyperglycemia, without long-term current use of insulin  Well controlled, continue metformin and Jardiance. Most recent HgbA1c 6.4. Patient should monitor blood glucose levels closely and call or return to clinic with consistent elevations or frequent lows. Repeat labs in clinic today. Diabetic eye exam: Patient to schedule. Diabetic foot exam: Today. Urine microalbumin: Today.  Renal protection: ACE. Pneumonia vaccination: Agreeable today.   Essential hypertension  Mild elevation, he has not had his medication yet today. Continue lisinopril. Patient should monitor blood pressure at home and call or return to clinic with consistent elevations greater than 130/80. Labs in clinic today.  Hyperlipidemia, unspecified hyperlipidemia type  Well controlled, continue statin. Most recent LDL 69. Lipid panel with labs.  Palpitations  Patient to continue with scheduled tests as ordered by cardiology. He should seek medical attention immediately with persistent chest pain, shortness of breath, diaphoresis.   Fatigue, unspecified type  Labs today, including testosterone.   Screening for condition  Hepatitis C screening today.     Orders Placed This Encounter   Procedures    Pneumococcal Conjugate Vaccine 20-Valent (PCV20)    Comprehensive Metabolic Panel    Lipid  Yes Panel    TSH    MicroAlbumin, Urine, Random - Urine, Clean Catch    Hemoglobin A1c    Hepatitis C Antibody    T4, free    Testosterone, Free, Total    Vitamin B12 & Folate    Ferritin    Iron Profile    CBC Auto Differential    CBC & Differential     New Medications Ordered This Visit   Medications    metFORMIN (GLUCOPHAGE) 1000 MG tablet     Sig: Take 1 tablet by mouth 2 (Two) Times a Day With Meals for 90 days.     Dispense:  180 tablet     Refill:  1           FOLLOW UP  Return in about 6 months (around 9/18/2024).  Patient was given instructions and counseling regarding his condition or for health maintenance advice. Please see specific information pulled into the AVS if appropriate.       Tierra Askew, ELIO  03/18/24  10:37 EDT    CURRENT & DISCONTINUED MEDICATIONS  Current Outpatient Medications   Medication Instructions    atorvastatin (LIPITOR) 40 MG tablet Take 1 tablet by mouth once daily    Jardiance 25 mg, Oral, Daily    lisinopril (PRINIVIL,ZESTRIL) 20 mg, Oral, Daily    metFORMIN (GLUCOPHAGE) 1,000 mg, Oral, 2 Times Daily With Meals       Medications Discontinued During This Encounter   Medication Reason    diclofenac (VOLTAREN) 75 MG EC tablet     metFORMIN (GLUCOPHAGE) 1000 MG tablet Reorder    glucose blood (ReliOn True Metrix Test Strips) test strip

## 2024-03-23 NOTE — PATIENT PROFILE ADULT - FLU SEASON?
No CARE PROVIDERS:  Accepting Physician: Ayesha Bennett  Admitting: Ayesha Bennett  Attending: Ayesha Bennett  Consultant: Trenton Mota  Consultant: Dusty Hammer  Consultant: Len Roberts  Covering Team: Marcelo Garcia ED ACP: Mariella Rod ED Attending: Moe Brower ED Nurse: Keya Bennett  Nurse: Rip Hurst  Nurse: Kaley Walton  Nurse: Elliot Pratt  Override: Elliot Pratt  PCA/Nursing Assistant: Dea Marks  PCA/Nursing Assistant: Nile Finney  Registered Dietitian: Charity Collado  Respiratory Therapy: Elliot Leo  : Rajani Davey// Supp. Assoc.: Hailee Razo

## 2024-04-29 ENCOUNTER — APPOINTMENT (OUTPATIENT)
Dept: CARDIOLOGY | Facility: CLINIC | Age: 74
End: 2024-04-29
Payer: MEDICARE

## 2024-04-29 VITALS
HEART RATE: 87 BPM | BODY MASS INDEX: 28.71 KG/M2 | HEIGHT: 62 IN | SYSTOLIC BLOOD PRESSURE: 132 MMHG | DIASTOLIC BLOOD PRESSURE: 84 MMHG | WEIGHT: 156 LBS

## 2024-04-29 DIAGNOSIS — E78.5 HYPERLIPIDEMIA, UNSPECIFIED: ICD-10-CM

## 2024-04-29 DIAGNOSIS — I10 ESSENTIAL (PRIMARY) HYPERTENSION: ICD-10-CM

## 2024-04-29 DIAGNOSIS — Z87.898 PERSONAL HISTORY OF OTHER SPECIFIED CONDITIONS: ICD-10-CM

## 2024-04-29 DIAGNOSIS — R00.2 PALPITATIONS: ICD-10-CM

## 2024-04-29 LAB
ALBUMIN SERPL ELPH-MCNC: 4.6 G/DL
ALBUMIN SERPL ELPH-MCNC: 4.7 G/DL
ALP BLD-CCNC: 68 U/L
ALP BLD-CCNC: 73 U/L
ALT SERPL-CCNC: 21 U/L
ALT SERPL-CCNC: 24 U/L
ANION GAP SERPL CALC-SCNC: 12 MMOL/L
ANION GAP SERPL CALC-SCNC: 13 MMOL/L
AST SERPL-CCNC: 25 U/L
AST SERPL-CCNC: 26 U/L
BILIRUB SERPL-MCNC: 0.3 MG/DL
BILIRUB SERPL-MCNC: 0.4 MG/DL
BUN SERPL-MCNC: 16 MG/DL
BUN SERPL-MCNC: 17 MG/DL
CALCIUM SERPL-MCNC: 10 MG/DL
CALCIUM SERPL-MCNC: 9.4 MG/DL
CHLORIDE SERPL-SCNC: 98 MMOL/L
CHLORIDE SERPL-SCNC: 98 MMOL/L
CHOLEST SERPL-MCNC: 219 MG/DL
CHOLEST SERPL-MCNC: 221 MG/DL
CO2 SERPL-SCNC: 29 MMOL/L
CO2 SERPL-SCNC: 29 MMOL/L
CREAT SERPL-MCNC: 1 MG/DL
CREAT SERPL-MCNC: 1 MG/DL
EGFR: 60 ML/MIN/1.73M2
EGFR: 60 ML/MIN/1.73M2
ESTIMATED AVERAGE GLUCOSE: 131 MG/DL
GLUCOSE SERPL-MCNC: 114 MG/DL
GLUCOSE SERPL-MCNC: 120 MG/DL
HBA1C MFR BLD HPLC: 6.2 %
HDLC SERPL-MCNC: 46 MG/DL
HDLC SERPL-MCNC: 46 MG/DL
LDLC SERPL CALC-MCNC: 122 MG/DL
LDLC SERPL CALC-MCNC: 124 MG/DL
NONHDLC SERPL-MCNC: 173 MG/DL
NONHDLC SERPL-MCNC: 175 MG/DL
POTASSIUM SERPL-SCNC: 4.1 MMOL/L
POTASSIUM SERPL-SCNC: 4.7 MMOL/L
PROT SERPL-MCNC: 7.1 G/DL
PROT SERPL-MCNC: 7.2 G/DL
SODIUM SERPL-SCNC: 139 MMOL/L
SODIUM SERPL-SCNC: 140 MMOL/L
TRIGL SERPL-MCNC: 246 MG/DL
TRIGL SERPL-MCNC: 265 MG/DL

## 2024-04-29 PROCEDURE — 93000 ELECTROCARDIOGRAM COMPLETE: CPT

## 2024-04-29 PROCEDURE — 99214 OFFICE O/P EST MOD 30 MIN: CPT

## 2024-04-29 RX ORDER — MUPIROCIN 20 MG/G
2 OINTMENT TOPICAL
Qty: 22 | Refills: 0 | Status: ACTIVE | COMMUNITY
Start: 2024-04-04

## 2024-04-29 RX ORDER — CLONAZEPAM 0.5 MG/1
0.5 TABLET ORAL
Qty: 90 | Refills: 0 | Status: ACTIVE | COMMUNITY
Start: 2024-03-15

## 2024-04-29 RX ORDER — CLONAZEPAM 1 MG
1 TABLET,DISINTEGRATING ORAL TWICE DAILY
Refills: 0 | Status: ACTIVE | COMMUNITY

## 2024-04-29 NOTE — ASSESSMENT
[FreeTextEntry1] : BP controlled.  Palpitations likely related to anxiety. Reassuring MCOT  Tolerating statin.  ECHO (12/21/21): nL LVSF.  Border LVH.  Trace AI.

## 2024-04-29 NOTE — REASON FOR VISIT
[FreeTextEntry1] : Essentially, stable.  Pain / anxiety / RSD / pancreatitis dominant issues.  Persistent seem a bit better.  Tolerating medications.  MCOT (10/2023): No arrhythmias.  Less than 1% ectopy.  Labs reviewed (9/2023 and 3/2024): , triglycerides 265, HDL 46 A1c 6.2 CMP unremarkable

## 2024-04-29 NOTE — DISCUSSION/SUMMARY
[FreeTextEntry1] : Cont Livalo. Cont HCTZ. Regular PMD / specialist follow-up Follow-up 1-year / as needed [EKG obtained to assist in diagnosis and management of assessed problem(s)] : EKG obtained to assist in diagnosis and management of assessed problem(s)

## 2024-04-29 NOTE — PHYSICAL EXAM
[de-identified] : well appearing, overweight, no distress [de-identified] : alert, anxious, logical conversation

## 2024-12-16 ENCOUNTER — EMERGENCY (EMERGENCY)
Facility: HOSPITAL | Age: 74
LOS: 0 days | Discharge: HOME | End: 2024-12-16
Attending: STUDENT IN AN ORGANIZED HEALTH CARE EDUCATION/TRAINING PROGRAM
Payer: MEDICARE

## 2024-12-16 VITALS
TEMPERATURE: 99 F | RESPIRATION RATE: 18 BRPM | DIASTOLIC BLOOD PRESSURE: 65 MMHG | SYSTOLIC BLOOD PRESSURE: 147 MMHG | HEART RATE: 98 BPM | OXYGEN SATURATION: 95 % | WEIGHT: 158.95 LBS

## 2024-12-16 DIAGNOSIS — Z90.11 ACQUIRED ABSENCE OF RIGHT BREAST AND NIPPLE: ICD-10-CM

## 2024-12-16 DIAGNOSIS — R07.89 OTHER CHEST PAIN: ICD-10-CM

## 2024-12-16 DIAGNOSIS — F41.9 ANXIETY DISORDER, UNSPECIFIED: ICD-10-CM

## 2024-12-16 DIAGNOSIS — Z85.3 PERSONAL HISTORY OF MALIGNANT NEOPLASM OF BREAST: ICD-10-CM

## 2024-12-16 DIAGNOSIS — Z98.890 OTHER SPECIFIED POSTPROCEDURAL STATES: Chronic | ICD-10-CM

## 2024-12-16 DIAGNOSIS — Z85.858 PERSONAL HISTORY OF MALIGNANT NEOPLASM OF OTHER ENDOCRINE GLANDS: ICD-10-CM

## 2024-12-16 DIAGNOSIS — Z98.890 OTHER SPECIFIED POSTPROCEDURAL STATES: ICD-10-CM

## 2024-12-16 DIAGNOSIS — R06.02 SHORTNESS OF BREATH: ICD-10-CM

## 2024-12-16 DIAGNOSIS — E78.5 HYPERLIPIDEMIA, UNSPECIFIED: ICD-10-CM

## 2024-12-16 DIAGNOSIS — R79.1 ABNORMAL COAGULATION PROFILE: ICD-10-CM

## 2024-12-16 DIAGNOSIS — Z91.041 RADIOGRAPHIC DYE ALLERGY STATUS: ICD-10-CM

## 2024-12-16 LAB
ALBUMIN SERPL ELPH-MCNC: 4.2 G/DL — SIGNIFICANT CHANGE UP (ref 3.5–5.2)
ALP SERPL-CCNC: 78 U/L — SIGNIFICANT CHANGE UP (ref 30–115)
ALT FLD-CCNC: 13 U/L — SIGNIFICANT CHANGE UP (ref 0–41)
ANION GAP SERPL CALC-SCNC: 11 MMOL/L — SIGNIFICANT CHANGE UP (ref 7–14)
AST SERPL-CCNC: 18 U/L — SIGNIFICANT CHANGE UP (ref 0–41)
BASOPHILS # BLD AUTO: 0.05 K/UL — SIGNIFICANT CHANGE UP (ref 0–0.2)
BASOPHILS NFR BLD AUTO: 0.5 % — SIGNIFICANT CHANGE UP (ref 0–1)
BILIRUB SERPL-MCNC: 0.3 MG/DL — SIGNIFICANT CHANGE UP (ref 0.2–1.2)
BUN SERPL-MCNC: 14 MG/DL — SIGNIFICANT CHANGE UP (ref 10–20)
CALCIUM SERPL-MCNC: 9 MG/DL — SIGNIFICANT CHANGE UP (ref 8.4–10.5)
CHLORIDE SERPL-SCNC: 100 MMOL/L — SIGNIFICANT CHANGE UP (ref 98–110)
CO2 SERPL-SCNC: 31 MMOL/L — SIGNIFICANT CHANGE UP (ref 17–32)
CREAT SERPL-MCNC: 0.9 MG/DL — SIGNIFICANT CHANGE UP (ref 0.7–1.5)
D DIMER BLD IA.RAPID-MCNC: 551 NG/ML DDU — HIGH
EGFR: 68 ML/MIN/1.73M2 — SIGNIFICANT CHANGE UP
EOSINOPHIL # BLD AUTO: 0.08 K/UL — SIGNIFICANT CHANGE UP (ref 0–0.7)
EOSINOPHIL NFR BLD AUTO: 0.8 % — SIGNIFICANT CHANGE UP (ref 0–8)
GLUCOSE SERPL-MCNC: 98 MG/DL — SIGNIFICANT CHANGE UP (ref 70–99)
HCT VFR BLD CALC: 41.6 % — SIGNIFICANT CHANGE UP (ref 37–47)
HGB BLD-MCNC: 13.8 G/DL — SIGNIFICANT CHANGE UP (ref 12–16)
IMM GRANULOCYTES NFR BLD AUTO: 0.3 % — SIGNIFICANT CHANGE UP (ref 0.1–0.3)
LIDOCAIN IGE QN: 9 U/L — SIGNIFICANT CHANGE UP (ref 7–60)
LYMPHOCYTES # BLD AUTO: 3.17 K/UL — SIGNIFICANT CHANGE UP (ref 1.2–3.4)
LYMPHOCYTES # BLD AUTO: 32.6 % — SIGNIFICANT CHANGE UP (ref 20.5–51.1)
MAGNESIUM SERPL-MCNC: 2.3 MG/DL — SIGNIFICANT CHANGE UP (ref 1.8–2.4)
MCHC RBC-ENTMCNC: 30.1 PG — SIGNIFICANT CHANGE UP (ref 27–31)
MCHC RBC-ENTMCNC: 33.2 G/DL — SIGNIFICANT CHANGE UP (ref 32–37)
MCV RBC AUTO: 90.8 FL — SIGNIFICANT CHANGE UP (ref 81–99)
MONOCYTES # BLD AUTO: 0.68 K/UL — HIGH (ref 0.1–0.6)
MONOCYTES NFR BLD AUTO: 7 % — SIGNIFICANT CHANGE UP (ref 1.7–9.3)
NEUTROPHILS # BLD AUTO: 5.72 K/UL — SIGNIFICANT CHANGE UP (ref 1.4–6.5)
NEUTROPHILS NFR BLD AUTO: 58.8 % — SIGNIFICANT CHANGE UP (ref 42.2–75.2)
NRBC # BLD: 0 /100 WBCS — SIGNIFICANT CHANGE UP (ref 0–0)
NT-PROBNP SERPL-SCNC: 45 PG/ML — SIGNIFICANT CHANGE UP (ref 0–300)
PLATELET # BLD AUTO: 183 K/UL — SIGNIFICANT CHANGE UP (ref 130–400)
PMV BLD: 10.8 FL — HIGH (ref 7.4–10.4)
POTASSIUM SERPL-MCNC: 4 MMOL/L — SIGNIFICANT CHANGE UP (ref 3.5–5)
POTASSIUM SERPL-SCNC: 4 MMOL/L — SIGNIFICANT CHANGE UP (ref 3.5–5)
PROT SERPL-MCNC: 6.6 G/DL — SIGNIFICANT CHANGE UP (ref 6–8)
RBC # BLD: 4.58 M/UL — SIGNIFICANT CHANGE UP (ref 4.2–5.4)
RBC # FLD: 12.3 % — SIGNIFICANT CHANGE UP (ref 11.5–14.5)
SODIUM SERPL-SCNC: 142 MMOL/L — SIGNIFICANT CHANGE UP (ref 135–146)
TROPONIN T, HIGH SENSITIVITY RESULT: <6 NG/L — SIGNIFICANT CHANGE UP (ref 6–13)
WBC # BLD: 9.73 K/UL — SIGNIFICANT CHANGE UP (ref 4.8–10.8)
WBC # FLD AUTO: 9.73 K/UL — SIGNIFICANT CHANGE UP (ref 4.8–10.8)

## 2024-12-16 PROCEDURE — 78582 LUNG VENTILAT&PERFUS IMAGING: CPT | Mod: MC

## 2024-12-16 PROCEDURE — A9540: CPT

## 2024-12-16 PROCEDURE — 71045 X-RAY EXAM CHEST 1 VIEW: CPT | Mod: 26

## 2024-12-16 PROCEDURE — 78582 LUNG VENTILAT&PERFUS IMAGING: CPT | Mod: 26,MC

## 2024-12-16 PROCEDURE — 36000 PLACE NEEDLE IN VEIN: CPT | Mod: XU

## 2024-12-16 PROCEDURE — 93970 EXTREMITY STUDY: CPT | Mod: 26

## 2024-12-16 PROCEDURE — A9567: CPT

## 2024-12-16 PROCEDURE — 99285 EMERGENCY DEPT VISIT HI MDM: CPT | Mod: 25

## 2024-12-16 PROCEDURE — 99285 EMERGENCY DEPT VISIT HI MDM: CPT | Mod: FS

## 2024-12-16 NOTE — ED PROVIDER NOTE - PHYSICAL EXAMINATION
VITAL SIGNS: I have reviewed nursing notes and confirm.  CONSTITUTIONAL: 73 yo F laying on stretcher; in no acute distress.  SKIN: Skin exam is warm and dry, no acute rash.  HEAD: Normocephalic; atraumatic.  EYES: Conjunctiva and sclera clear.  ENT: No nasal discharge; airway clear.   NECK: Supple; non tender.  CARD: S1, S2 normal; no murmurs, gallops, or rubs. Regular rate and rhythm.  RESP: No wheezes, rales or rhonchi. Speaking in full sentences.   ABD: Normal bowel sounds; soft; non-distended; non-tender; No rebound or guarding. No CVA tenderness.  EXT: Normal ROM. No clubbing, cyanosis or edema. No calf TTP or swelling. Radial and DP 2+ B/L and equal.   NEURO: Alert, oriented. Grossly unremarkable. No focal deficits.

## 2024-12-16 NOTE — ED PROVIDER NOTE - PRO INTERPRETER NEED 2
Addended by: SANDRA ORNELAS on: 6/5/2023 11:54 AM     Modules accepted: Level of Service, SmartSet     English

## 2024-12-16 NOTE — ED PROVIDER NOTE - PATIENT PORTAL LINK FT
You can access the FollowMyHealth Patient Portal offered by [facility] by registering at the following website: [email or web address]. By joining [employer]’s FollowMyHealth portal, you will also be able to view your health information using other applications (apps) compatible with our system.

## 2024-12-16 NOTE — ED PROVIDER NOTE - PROGRESS NOTE DETAILS
Javier Orellana, DO: Patient arrived to [facility]. VSS - HD stable, tolerating RA.  Pending VQ scan. Javier Orellana, DO:   VQ scan reassuring. Patient states that she has a cardiology appointment in 4 days.  Advised to continue follow-up as scheduled.  Patient is well appearing, NAD, afebrile, hemodynamically stable. Any available tests and studies were discussed with patient and/or family. Discharged with instructions in further symptomatic care, return precautions, and need for PMD f/u.

## 2024-12-16 NOTE — ED PROVIDER NOTE - OBJECTIVE STATEMENT
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  Pain sometimes worse with exertion, is associated with shortness of breath and nonradiating.  She has not had any recent cardiac eval.  She denies other complaints. Pt denies fever, chills, nausea, vomiting, abd pain, diarrhea, headache, dizziness, weakness, back pain, LOC, trauma, urinary symptoms, cough, calf pain/swelling, recent travel, recent surgery.

## 2024-12-16 NOTE — ED ADULT TRIAGE NOTE - CHIEF COMPLAINT QUOTE
chest pain for a couple of weeks and shortness of breathe; patient states "I feel like an elephant was on my chest"

## 2024-12-16 NOTE — ED PROVIDER NOTE - NSFOLLOWUPINSTRUCTIONS_ED_ALL_ED_FT
Follow up with your cardiologist as scheduled.  Your results including the VQ scan was reassuring. No evidence of pulmonary embolism at this time.    Chest Pain    You were seen and evaluated for chest pain. After a work up in the Emergency Department, it was determined that it is safe for you to be discharged with close follow up. Please monitor your symptoms and bring all of your results to follow up with your doctor. Please call for follow up with the heart doctor. As discussed, you may require further work up and testing for your chest pain.    WHAT YOU NEED TO KNOW:  Chest pain can be caused by a range of conditions, from not serious to life-threatening. Chest pain can be a symptom of a digestive problem, such as acid reflux or a stomach ulcer. An anxiety attack or a strong emotion, such as anger, can also cause chest pain. Infection, inflammation, or a fracture in the bones or cartilage in your chest can cause pain or discomfort. Sometimes chest pain or pressure is caused by poor blood flow to your heart (angina). Chest pain may also be caused by life-threatening conditions such as a heart attack or blood clot in your lungs.      DISCHARGE INSTRUCTIONS:  Call your local emergency number (911 in the [place]) or have someone call if:  You have any of the following signs of a heart attack:  Squeezing, pressure, or pain in your chest  You may also have any of the following:  Discomfort or pain in your back, neck, jaw, stomach, or arm  Shortness of breath  Nausea or vomiting  Lightheadedness or a sudden cold sweat  Return to the emergency department if:  You have chest discomfort that gets worse, even with medicine.  You cough or vomit blood.  Your bowel movements are black or bloody.  You cannot stop vomiting, or it hurts to swallow.  Call your doctor if:  You have questions or concerns about your condition or care.  Medicines:  Medicines may be given to treat the cause of your chest pain. Examples include pain medicine, anxiety medicine, or medicines to increase blood flow to your heart.  Do not take certain medicines without asking your healthcare provider first. These include NSAIDs, herbal or vitamin supplements, or hormones (estrogen or progestin).  Take your medicine as directed. Contact your healthcare provider if you think your medicine is not helping or if you have side effects. Tell him or her if you are allergic to any medicine. Keep a list of the medicines, vitamins, and herbs you take. Include the amounts, and when and why you take them. Bring the list or the pill bottles to follow-up visits. Carry your medicine list with you in case of an emergency.    Healthy living tips:  The following are general healthy guidelines. If the cause of your chest pain is known, your healthcare provider will give you specific guidelines to follow.    Do not smoke. Nicotine and other chemicals in cigarettes and cigars can cause lung and heart damage. Ask your healthcare provider for information if you currently smoke and need help to quit. E-cigarettes or smokeless tobacco still contain nicotine. Talk to your healthcare provider before you use these products.  Choose a variety of healthy foods as often as possible. Include fresh, frozen, or canned fruits and vegetables. Also include low-fat dairy products, fish, chicken (without skin), and lean meats. Your healthcare provider or a dietitian can help you create meal plans. You may need to avoid certain foods or drinks if your pain is caused by a digestion problem.  Healthy Foods  Lower your sodium (salt) intake. Limit foods that are high in sodium, such as canned foods, salty snacks, and cold cuts. If you add salt when you cook food, do not add more at the table. Choose low-sodium canned foods as much as possible.    Drink plenty of water every day. Water helps your body to control your temperature and blood pressure. Ask your healthcare provider how much water you should drink every day.  Ask about activity. Your healthcare provider will tell you which activities to limit or avoid. Ask when you can drive, return to work, and have sex. Ask about the best exercise plan for you.  Maintain a healthy weight. Ask your healthcare provider what a healthy weight is for you. Ask him or her to help you create a safe weight loss plan if you are overweight.    Follow up with your healthcare provider within 72 hours, or as directed:  You may need to return for more tests to find the cause of your chest pain. You may be referred to a specialist, such as a cardiologist or gastroenterologist. Write down your questions so you remember to ask them during your visits.

## 2024-12-16 NOTE — ED ADULT NURSE REASSESSMENT NOTE - NS ED NURSE REASSESS COMMENT FT1
patient to be transferred to [place] ED, report given to charge CANDE Andre, EMS transport here to p/u patient

## 2024-12-16 NOTE — ED PROVIDER NOTE - CLINICAL SUMMARY MEDICAL DECISION MAKING FREE TEXT BOX
s/o received from Dr. Disla    Patient has medical history of multiple malignancies and is presenting with chest pain shortness of breath present for the past week.  Prior team sent labs including a D-dimer which is positive.  Patient has had anaphylactic reactions to IV contrast in the future and does not want consider premedication.  Was transferred to [facility] for consideration of VQ scan.  Patient arrived to the ED in stable condition with normal vitals.  Her exam is benign.  Agree with exam as documented by PA above.  Labs reviewed which indeed show a positive D-dimer, otherwise unremarkable including a negative troponin.  EKG is nonischemic.  Chest x-ray is clear.  Patient received her VQ scan which was negative for PE.  Patient amenable with discharge home.  Given cardiology follow-up.  Return precautions discussed.  All questions answered.  Patient verbalized understanding the plan

## 2024-12-16 NOTE — ED PROVIDER NOTE - ATTENDING APP SHARED VISIT CONTRIBUTION OF CARE
Patient complains of chest pain.  She also has mild shortness of breath.  Patient has a past medical history of several malignancies.  Symptoms have been present for a week.  Vital signs noted.  Chest clear.  Heart regular rate no murmur.  Abdomen nontender.  Extremity equal pulses.  EKG shows no ischemia.  Troponin is negative.  However D-dimer is nonnegative.  Patient has had anaphylactic reactions to IV contrast and refuses to consider premedication.  Nuclear medicine available at  at this time.  I spoke to the nuclear medicine technologist at the [facility].  They will be present till 11:00 tonight.  Doppler is negative for DVT.  Patient be transferred [facility] ED for VQ scan to rule out a PE.  Bedside echo done shows normal LV and RV function.

## 2024-12-19 ENCOUNTER — APPOINTMENT (OUTPATIENT)
Dept: GASTROENTEROLOGY | Facility: CLINIC | Age: 74
End: 2024-12-19
Payer: MEDICARE

## 2024-12-19 DIAGNOSIS — R10.9 UNSPECIFIED ABDOMINAL PAIN: ICD-10-CM

## 2024-12-19 DIAGNOSIS — Z78.9 OTHER SPECIFIED HEALTH STATUS: ICD-10-CM

## 2024-12-19 PROCEDURE — 99442: CPT | Mod: 93

## 2024-12-20 ENCOUNTER — APPOINTMENT (OUTPATIENT)
Dept: CARDIOLOGY | Facility: CLINIC | Age: 74
End: 2024-12-20
Payer: MEDICARE

## 2024-12-20 VITALS
SYSTOLIC BLOOD PRESSURE: 132 MMHG | BODY MASS INDEX: 28.16 KG/M2 | HEIGHT: 62 IN | HEART RATE: 93 BPM | WEIGHT: 153 LBS | DIASTOLIC BLOOD PRESSURE: 76 MMHG

## 2024-12-20 DIAGNOSIS — R00.2 PALPITATIONS: ICD-10-CM

## 2024-12-20 DIAGNOSIS — I10 ESSENTIAL (PRIMARY) HYPERTENSION: ICD-10-CM

## 2024-12-20 DIAGNOSIS — R06.00 DYSPNEA, UNSPECIFIED: ICD-10-CM

## 2024-12-20 PROCEDURE — 99214 OFFICE O/P EST MOD 30 MIN: CPT

## 2024-12-20 PROCEDURE — 93000 ELECTROCARDIOGRAM COMPLETE: CPT

## 2025-01-17 ENCOUNTER — APPOINTMENT (OUTPATIENT)
Dept: CARDIOLOGY | Facility: CLINIC | Age: 75
End: 2025-01-17

## 2025-01-17 DIAGNOSIS — R06.00 DYSPNEA, UNSPECIFIED: ICD-10-CM

## 2025-01-17 DIAGNOSIS — R00.2 PALPITATIONS: ICD-10-CM

## 2025-01-17 PROCEDURE — 93306 TTE W/DOPPLER COMPLETE: CPT

## 2025-02-06 ENCOUNTER — NON-APPOINTMENT (OUTPATIENT)
Age: 75
End: 2025-02-06

## 2025-04-12 ENCOUNTER — NON-APPOINTMENT (OUTPATIENT)
Age: 75
End: 2025-04-12

## 2025-04-14 ENCOUNTER — APPOINTMENT (OUTPATIENT)
Dept: CARDIOLOGY | Facility: CLINIC | Age: 75
End: 2025-04-14

## 2025-04-28 ENCOUNTER — APPOINTMENT (OUTPATIENT)
Dept: CARDIOLOGY | Facility: CLINIC | Age: 75
End: 2025-04-28

## 2025-05-02 NOTE — ED ADULT NURSE NOTE - PSH
Approved today by Johnie 2017 [facility]  Request Reference Number: [id number]. REPATHA SURE INJ 140MG/ML is approved through 12/31/2039. Your patient may now fill this prescription and it will be covered.  Effective Date: 5/2/2025  Authorization Expiration Date: 12/31/2039   H/O mastectomy, right

## 2025-05-08 ENCOUNTER — NON-APPOINTMENT (OUTPATIENT)
Age: 75
End: 2025-05-08

## 2025-05-12 ENCOUNTER — APPOINTMENT (OUTPATIENT)
Dept: CARDIOLOGY | Facility: CLINIC | Age: 75
End: 2025-05-12
Payer: MEDICARE

## 2025-05-12 VITALS
HEART RATE: 94 BPM | BODY MASS INDEX: 27.25 KG/M2 | SYSTOLIC BLOOD PRESSURE: 126 MMHG | WEIGHT: 149 LBS | DIASTOLIC BLOOD PRESSURE: 74 MMHG

## 2025-05-12 DIAGNOSIS — E78.5 HYPERLIPIDEMIA, UNSPECIFIED: ICD-10-CM

## 2025-05-12 DIAGNOSIS — I10 ESSENTIAL (PRIMARY) HYPERTENSION: ICD-10-CM

## 2025-05-12 DIAGNOSIS — R00.2 PALPITATIONS: ICD-10-CM

## 2025-05-12 PROCEDURE — 93000 ELECTROCARDIOGRAM COMPLETE: CPT

## 2025-05-12 PROCEDURE — 99214 OFFICE O/P EST MOD 30 MIN: CPT

## 2025-07-02 ENCOUNTER — APPOINTMENT (OUTPATIENT)
Dept: ORTHOPEDIC SURGERY | Facility: CLINIC | Age: 75
End: 2025-07-02

## 2025-07-02 PROCEDURE — 99204 OFFICE O/P NEW MOD 45 MIN: CPT
